# Patient Record
Sex: MALE
[De-identification: names, ages, dates, MRNs, and addresses within clinical notes are randomized per-mention and may not be internally consistent; named-entity substitution may affect disease eponyms.]

---

## 2017-01-01 PROBLEM — R05 COUGH: Status: ACTIVE | Noted: 2017-01-01

## 2017-01-10 ENCOUNTER — APPOINTMENT (OUTPATIENT)
Dept: RHEUMATOLOGY | Facility: CLINIC | Age: 62
End: 2017-01-10

## 2017-01-10 VITALS
HEART RATE: 96 BPM | WEIGHT: 230 LBS | HEIGHT: 72 IN | BODY MASS INDEX: 31.15 KG/M2 | DIASTOLIC BLOOD PRESSURE: 70 MMHG | SYSTOLIC BLOOD PRESSURE: 110 MMHG

## 2017-01-10 DIAGNOSIS — R53.82 CHRONIC FATIGUE, UNSPECIFIED: ICD-10-CM

## 2017-01-10 DIAGNOSIS — E83.119 HEMOCHROMATOSIS, UNSPECIFIED: ICD-10-CM

## 2017-01-10 DIAGNOSIS — E66.9 OBESITY, UNSPECIFIED: ICD-10-CM

## 2017-01-10 DIAGNOSIS — F41.8 OTHER SPECIFIED ANXIETY DISORDERS: ICD-10-CM

## 2022-04-06 ENCOUNTER — APPOINTMENT (OUTPATIENT)
Dept: PAIN MANAGEMENT | Facility: CLINIC | Age: 67
End: 2022-04-06
Payer: COMMERCIAL

## 2022-04-06 VITALS — HEIGHT: 72 IN | WEIGHT: 235 LBS | BODY MASS INDEX: 31.83 KG/M2

## 2022-04-06 PROCEDURE — 99214 OFFICE O/P EST MOD 30 MIN: CPT

## 2022-04-06 RX ORDER — NAPROXEN 500 MG/1
500 TABLET ORAL
Refills: 0 | Status: ACTIVE | COMMUNITY

## 2022-04-06 NOTE — PHYSICAL EXAM
[Normal Coordination] : normal coordination [Normal Mood and Affect] : normal mood and affect [] : diminished ROM in all planes [5___] : right quadriceps 5[unfilled]/5

## 2022-04-24 ENCOUNTER — TRANSCRIPTION ENCOUNTER (OUTPATIENT)
Age: 67
End: 2022-04-24

## 2022-04-27 ENCOUNTER — APPOINTMENT (OUTPATIENT)
Dept: PAIN MANAGEMENT | Facility: CLINIC | Age: 67
End: 2022-04-27
Payer: COMMERCIAL

## 2022-04-27 PROCEDURE — 64635 DESTROY LUMB/SAC FACET JNT: CPT | Mod: RT

## 2022-04-27 PROCEDURE — J3490M: CUSTOM

## 2022-04-27 PROCEDURE — 64636Z: CUSTOM | Mod: 59,RT

## 2022-04-27 NOTE — PROCEDURE
[FreeTextEntry3] : Date of Service: 04/27/2022 \par \par Account: [id number]\par \par Patient: TAMMIE HART \par \par YOB: 1955\par \par Age: 66 year\par \par \par PREOPERATIVE DIAGNOSIS: Spondylosis of Lumbar Region without Myelopathy or Radiculopathy (M47.816)\par \par     1. \par \par         POSTOPERATIVE DIAGNOSIS: Spondylosis of Lumbar Region without Myelopathy or Radiculopathy (M47.816)\par \par     1. \par \par         PROCEDURE:\par \par         1) Right L3, L4, L5 and Left L3, L4, L5 medial branch radiofrequency ablation under fluoroscopic guidance.                        \par \par Anesthesia:                                                      MAC\par \par \par Risks, benefits and alternatives of the procedure were discussed with the patient after which they agreed to proceed.  Patient was brought into fluoroscopy suite and was placed in prone position with hip support. Back was prepped and draped in a sterile fashion x3. Anesthesia initiated.\par \par Under AP visualization, the right and left sacral ala was identified and marked. Using a 25 gauge ½ inch needle the skin and subcutaneous structures at this point were localized with 1% Lidocaine using approximately 3 cc's 1% Lidocaine.  After this, a 20 gauge 100mm [place] radiofrequency needle with a 10mm curved tip was inserted and using depth direction depth technique under constant fluoroscopic visualization, the needle was advanced to the sacral ala until os was contacted. \par \par The camera was then redirected under AP view to visualize the right and left L4 and L5 vertebra. The camera was obliqued to approximately 30 degrees to reveal good Jake dog anatomical view. The junction of the superior articulate process and transverse process at the L4 and L5 levels  were then identified and marked. Skin and subcutaneous structures were then anesthetized with approximately 3 cc's of 1% Lidocaine at each of these levels. After which a 20 gauge 100mm [place] radiofrequency needle with a 10mm curved tip then advanced until the junction at the SAP and transverse process was met.  The camera was then directed through the lateral view and under constant fluoroscopic visualization, the needle tips were then advanced and confirmed at the junction of the SAP and transverse process. \par \par The stylette for the most cephalad needle at the L4 level was then removed and a [place] radiofrequency probe was then placed inside the needle. After their pedis' were checked at approximately 300 ohm, 50 hertz sensory stimulation was performed.  Patient experienced concordant pain in their low back at approximately 0.3 volts. The voltage was then increased to 1 volt. The patient reported increased low back pain symptoms without any radiation below the knee.  Stimulation was then changed to 2 hertz and increased slowly by .1 volt increments at approximately 0.5 volts, patient began to experience thumping like reproductive pain in their low back. The voltage was then increased to approximately 2.5 volts. Patient experienced increasing thumping without any sensation below their knee. This exact stimulation was then repeated for the L5 needle as well as sacral ala needle with concordant pain and no radiation below the knee. The 6 levels were then anesthetized with approximately 0.5 cc's of 1% Lidocaine. After which each area was then ablated at 80 degrees centigrade for 90 seconds each. Patient felt no pain reproduction during the ablation procedure.  After each of these levels were ablated and injected approximately 1 cc of 0.25% Bupivacaine plus 80 mg of Kenalog were then injected before the needles were removed.  Pressure was then applied to the low back. Band-aids were applied.  Patient was brought to the recovery, ambulated on their own after the procedure and reported decreased low back pain.\par \par Anesthesia personnel were present throughout the procedure. Patient was told to apply ice to the low back for 20 minutes on and 20 minutes off for focal symptoms for 24-48 hours. They should call the office if they have any questions or concerns. \par \par Amy Dinh M.D.\par

## 2022-05-18 ENCOUNTER — APPOINTMENT (OUTPATIENT)
Dept: PAIN MANAGEMENT | Facility: CLINIC | Age: 67
End: 2022-05-18
Payer: COMMERCIAL

## 2022-05-18 VITALS — WEIGHT: 235 LBS | BODY MASS INDEX: 31.83 KG/M2 | HEIGHT: 72 IN

## 2022-05-18 PROCEDURE — 99213 OFFICE O/P EST LOW 20 MIN: CPT

## 2022-05-18 NOTE — HISTORY OF PRESENT ILLNESS
[Lower back] : lower back [Dull/Aching] : dull/aching [Constant] : constant [Work] : work [Meds] : meds [6] : 6 [0] : 0 [Squeezing] : squeezing [] : no [FreeTextEntry1] : right side/hip [FreeTextEntry7] : Right leg

## 2022-05-29 ENCOUNTER — APPOINTMENT (OUTPATIENT)
Dept: ORTHOPEDIC SURGERY | Facility: CLINIC | Age: 67
End: 2022-05-29
Payer: COMMERCIAL

## 2022-05-29 VITALS — HEIGHT: 72 IN | BODY MASS INDEX: 31.83 KG/M2 | WEIGHT: 235 LBS

## 2022-05-29 PROCEDURE — 73010 X-RAY EXAM OF SHOULDER BLADE: CPT | Mod: RT

## 2022-05-29 PROCEDURE — 73030 X-RAY EXAM OF SHOULDER: CPT | Mod: RT

## 2022-05-29 PROCEDURE — 99203 OFFICE O/P NEW LOW 30 MIN: CPT

## 2022-05-29 RX ORDER — MELOXICAM 15 MG/1
15 TABLET ORAL DAILY
Qty: 30 | Refills: 0 | Status: ACTIVE | COMMUNITY
Start: 2022-05-29 | End: 1900-01-01

## 2022-05-29 NOTE — HISTORY OF PRESENT ILLNESS
[4] : 4 [de-identified] : This is a 66 year old RHD male with complaints of right shoulder pain for the "last few weeks". He denies any injury or trauma. He works as a  and attributes his pain to his work. Pain is worse with overhead lifting and behind his back. He has tried OTC medications with mild relief. He reports night pain, wakes him up. \par \par s/p right shoulder arthroscopy 2008 for a debridement \par \par PMH: denies \par Allergies: NKDA [FreeTextEntry5] : pt c.o pain in rt shoulder pain states he had sx on it in 2008 for impingement and gets shots occaisnally

## 2022-05-29 NOTE — DISCUSSION/SUMMARY
[de-identified] : Mr. TAMMIE HART is a 66 year year old male with right shoulder impingement syndrome. Diagnosis was discussed and treatment options were reviewed. The patient will begin a course of physical therapy and was provided with a prescription for oral anti-inflammatory medications. We discussed possible cortisone injection versus MRI if symptoms persist. He will follow up in 6 weeks with a shoulder specialist. \par \par The patient was prescribed an anti- inflammatory medication at today's visit. The patient was advised that this medication should be taken with food as they can cause gastrointestinal upset. They patient is also aware that these medications may exacerbate any pre existing hypertension and they should speak with their medical doctor before starting the medication. They were advised to stop the medication if they experience any stomach upset or develop headaches or blurry vision.\par \par

## 2022-05-29 NOTE — ASSESSMENT
[FreeTextEntry1] : 4 views of the shoulder demonstrate no fractures of dislocations. glenohumeral joint space well maintained. cystic changes about the greater tuberosity.

## 2022-06-12 ENCOUNTER — TRANSCRIPTION ENCOUNTER (OUTPATIENT)
Age: 67
End: 2022-06-12

## 2022-06-13 ENCOUNTER — APPOINTMENT (OUTPATIENT)
Dept: ORTHOPEDIC SURGERY | Facility: CLINIC | Age: 67
End: 2022-06-13
Payer: COMMERCIAL

## 2022-06-13 VITALS — BODY MASS INDEX: 31.83 KG/M2 | HEIGHT: 72 IN | WEIGHT: 235 LBS

## 2022-06-13 DIAGNOSIS — M75.21 BICIPITAL TENDINITIS, RIGHT SHOULDER: ICD-10-CM

## 2022-06-13 DIAGNOSIS — M25.511 PAIN IN RIGHT SHOULDER: ICD-10-CM

## 2022-06-13 PROCEDURE — 99213 OFFICE O/P EST LOW 20 MIN: CPT

## 2022-06-13 PROCEDURE — 99204 OFFICE O/P NEW MOD 45 MIN: CPT

## 2022-06-13 NOTE — PHYSICAL EXAM
[de-identified] : Constitutional: The general appearance of the patient is well developed, well nourished, no deformities and well groomed. Normal \par \par Gait: Gait and function is as follows: normal gait. \par \par Skin: Head and neck visualized skin is normal. Left upper extremity visualized skin is normal. Right upper extremity visualized skin is normal. Thoracic Skin of the thoracic spine shows visualized skin is normal. \par \par Cardiovascular: palpable radial pulse bilaterally, good capillary refill in digits of the bilateral upper extremities and no temperature or color changes in the bilateral upper extremities. \par \par Lymphatic: Normal Palpation of lymph nodes in the cervical. \par \par Neurologic: fine motor control in the bilateral upper extremities is intact. Deep Tendon Reflexes in Upper and Lower Extremities Negative Hobson's in the bilateral upper extremities. The patient is oriented to time, place and person. Sensation to light touch intact in the bilateral upper extremities. Mood and Affect is normal. \par \par Right Shoulder: Inspection of the shoulder/upper arm is as follows: no scapula winging, no biceps deformity and no AC joint deformity. Palpation of the shoulder/upper arm is as follows: There is tenderness at the proximal biceps tendon. Range of motion of the shoulder is as follows: Pain with internal rotation, external rotation, abduction and forward flexion. Strength of the shoulder is as follows: Supraspinatus 4/5. External Rotation 4/5. Internal Rotation 4/5. Deltoid 5/5 Ligament Stability and Special Tests of the shoulder is as follows: Neer test is positive. Britt' test is positive. Speed's test is positive. \par \par Left Shoulder: Inspection of the shoulder/upper arm is as follows: There is a full, pain-free, stable range of motion of the shoulder with normal strength and no tenderness to palpation. \par \par Neck: \par Inspection / Palpation of the cervical spine is as follows: There is a full, pain free, stable range of motion of the cervical spine with normal tone and no tenderness to palpation. \par \par Back, including spine: Inspection / Palpation of the thoracic/lumbar spine is as follows: There is a full, pain free, stable range of motion of the thoracic spine with a normal tone and not tenderness to palpation..\par

## 2022-06-13 NOTE — HISTORY OF PRESENT ILLNESS
[de-identified] : This is a 65yo male presenting to the office c/o ongoing right shoulder pain x6 months. Denies any specific injury or trauma. Pain is described as constant, reports pain has been getting progressively worse. Pain is located in the anterior and superior aspect of his shoulder. He presents with x-rays from [facility] which demonstrate AC joint narrowing. Patient denies any numbness or tingling. Patient is currently taking prednisone from a virus which is providing moderate relief for his shoulder pain. \par Works as \par

## 2022-06-13 NOTE — DISCUSSION/SUMMARY
[de-identified] : RUE: TTP LHBT, pain with bear hug and belly press\par +Speeds referred to biceps, + Obriens\par Pain and 90/90 ER \par (+drop arm, + painful arc)\par \par 67yo male presenting to the office c/o ongoing right shoulder pain x6 months. Denies any specific injury or trauma\par X-rays from  demonstrate AC joint narrowing \par Patient is currently taking prednisone for a virus which provides moderate relief for his shoulder pain at this time \par recommended MRI to further evaluate biceps labral complex injury\par Follow up 1-2 weeks \par Can consider cortisone injection (LHBT) pending MRI results\par \par \par Based on the patient’s history and physical exam findings, I am concerned about the possibility of a biceps labral complex tear.  The patient has pain and subjective weakness consistent with this diagnosis.  Therefore, I recommend an MRI to evaluate for a biceps labral tear. The patient will follow-up after MRI to discuss further treatment options. Surgery is an option if full thickness cuff tear seen on MRI\par \par (1) We discussed a comprehensive treatment plans that included a prescription management plan involving the use of prescription strength medications to include Ibuprofen 600-800 mg TID, versus 500-650 mg Tylenol. We also discussed prescribing topical diclofenac (Voltaren gel) as well as once daily Meloxicam 15 mg.\par (2) The patient has More Than One chronic injuries/illnesses as outlined, discussed, and documented by ICD 10 codes listed, as well as the HPI and Plan section.\par There is a moderate risk of morbidity with further treatment, especially from use of prescription strength medications and possible side effects of these medications which include upset stomach and cardiac/renal issues with long term use were discussed.\par (3) I recommended that the patient follow-up with their medical physician to discuss any significant specific potential issues with long term use such as interactions with current medications or with exacerbation of underlying medical morbidities. \par \par Attestation:\par I, Gloria Balderas , attest that this documentation has been prepared under the direction and in the presence of Provider Matt Ray MD.\par The documentation recorded by the scribe, in my presence, accurately reflects the service I personally performed, and the decisions made by me with my edits as appropriate.\par Matt Ray MD\par \par

## 2022-06-27 ENCOUNTER — APPOINTMENT (OUTPATIENT)
Dept: ORTHOPEDIC SURGERY | Facility: CLINIC | Age: 67
End: 2022-06-27

## 2022-08-01 ENCOUNTER — APPOINTMENT (OUTPATIENT)
Dept: ORTHOPEDIC SURGERY | Facility: CLINIC | Age: 67
End: 2022-08-01

## 2022-08-01 VITALS — WEIGHT: 235 LBS | HEIGHT: 72 IN | BODY MASS INDEX: 31.83 KG/M2

## 2022-08-01 PROCEDURE — 99213 OFFICE O/P EST LOW 20 MIN: CPT

## 2022-08-01 NOTE — HISTORY OF PRESENT ILLNESS
[de-identified] : Patient Complaint - 8/10/21- No relief from PT, wants to try LESIs\par 7/6/21- Had MRI: IMPRESSION:\par Straightening of lumbar lordosis. Grade 1 anterolisthesis of L3 on L4 and trace retrolisthesis of L5 on S1.\par L3-4: There is diffuse disc bulge, bilateral facet arthropathy and ligamentum flavum buckling. There is moderate spinal\par canal stenosis. There is bilateral lateral recess narrowing and indentation upon the descending both L4 nerve\par roots. There is mild right and mild-to-moderate left foraminal narrowing. There is encroachment upon the exiting left L3\par nerve root.\par L4-5: There is diffuse disc bulge and bilateral facet arthropathy. There is prominent dorsal epidural fat. Findings\par combined resulting severe thecal sac narrowing. There is bilateral lateral recess narrowing and indentation upon the\par descending both L5 nerve roots. There is mild right and mild-to-moderate left\par foraminal narrowing.\par L5-S1: There is a central disc herniation indenting upon the ventral thecal sac. There is no significant spinal canal\par stenosis. There is left greater than right facet arthropathy. There is no significant foraminal narrowing [FreeTextEntry1] : back [de-identified] : Patient has been going to pain management- Dr. Amy Dinh

## 2022-08-01 NOTE — REASON FOR VISIT
[FreeTextEntry2] : 8/1/22- Has finished pain management including ablation, no improvement, wants to know the next step

## 2022-08-01 NOTE — PHYSICAL EXAM
[Flexion] : flexion [Extension] : extension [Bending to left] : bending to left [Bending to right] : bending to right [Absent] : achilles reflex absent [] : negative sitting straight leg raise

## 2022-09-07 ENCOUNTER — APPOINTMENT (OUTPATIENT)
Dept: ORTHOPEDIC SURGERY | Facility: CLINIC | Age: 67
End: 2022-09-07

## 2022-09-07 DIAGNOSIS — R22.31 LOCALIZED SWELLING, MASS AND LUMP, RIGHT UPPER LIMB: ICD-10-CM

## 2022-09-07 PROCEDURE — 72110 X-RAY EXAM L-2 SPINE 4/>VWS: CPT

## 2022-09-07 PROCEDURE — 99215 OFFICE O/P EST HI 40 MIN: CPT

## 2022-09-07 PROCEDURE — 72170 X-RAY EXAM OF PELVIS: CPT

## 2022-09-07 NOTE — DISCUSSION/SUMMARY
[de-identified] : reviewed the imaging and the case with him \par \par has a painless lump in the right arm - \par indicated for MRi of the right arm to eval that \par \par for the spine we discussed tx optoins \par \par The patient has tried conservative treatment for this condition including time/activity modification/various medications/therapy/injections without significant relief.  Do not suspect that further conservative treatment will lead to a resolution of symptoms.  Patient remains with persistent activity limited symptoms and is therefore indicated for surgical intervention \par \par as far as surgery goes L3-5 fusion would be my thought - has a spondylolisthesis L3-4 and severe facet arthropathy at L4-5 \par \par d/w the patient for laminecotomy and/or fusion \par \par We've discussed the surgery details including instrumentation and grafting options (local, allograft, ICBG, and biologics) as well as potential for complications including but not limited to pain, scar and infection. There is also a possibility for hardware complication such as malposition of hardware,hardware loosening, pullout, failure or fracture of bone, adjacent segment disease,pseudarthrosis, and need for future surgery. We discussed potential for injury to nerves, spinal cord or blood vessels, paralysis, blindness, need for transfusion, general anesthesia, allergic reaction, prolonged intubation,myocardial infarction, stroke, deep venous thrombosis, pulmonary embolus, and death.  Spinal fluid leak may occur and may require prolonged time in the hospital and also further surgical procedures including drain placement.  The patient understands these things and all questions are answered to his/her satisfaction.\par \par The patient woud  need a medical clearance and pre-admission testing prior to surgery\par \par We would use neuromonitoring in order to keep things as safe as possilble.\par \par The procedure does not come with a guarantee of success or of satisfaction on the patient's behalf \par \par he is not intersted in the fusion surgery at this point in time and will seek additional opinions - if he reconsiders or has other questions he should follow up with me

## 2022-09-07 NOTE — HISTORY OF PRESENT ILLNESS
[Lower back] : lower back [Gradual] : gradual [Sudden] : sudden [8] : 8 [7] : 7 [Burning] : burning [Shooting] : shooting [Stabbing] : stabbing [Constant] : constant [Exercising] : exercising [de-identified] : 9/7/22: 67 y/o M presenting with L back pain. Seen by Dr. Srinivasan in the past and has tried PT and LESI with no relieve.  Down the right leg - 3 years of progressive symptoms - right side of the lower back \par \par Had seen Dr Dinh with short term relief with OBED and facet blocks \par Tried a brace \par ibuprofen \par \par XRAY L Spine: L4-5 Disc space narrowing with anterior osteophytes\par XRAY Pelvis: Mild joint space narrowing\par \par depression/anxiety \par sleep apnea\par No known cancer \par has knee pain that hes had injections \par \par  - working currently \par \par MRi l spine june 2021- L3-4: There is diffuse disc bulge, bilateral facet arthropathy and ligamentum\par flavum buckling. There is moderate spinal canal stenosis. There is bilateral\par lateral recess narrowing and indentation upon the descending both L4 nerve\par roots. There is mild right and mild-to-moderate left foraminal narrowing. There\par is encroachment upon the exiting left L3 nerve root.\par \par L4-5: There is diffuse disc bulge and bilateral facet arthropathy. There is\par prominent dorsal epidural fat. Findings combined resulting severe thecal sac\par narrowing. There is bilateral lateral recess narrowing and indentation upon the\par descending both L5 nerve roots. There is mild right and mild-to-moderate left\par foraminal narrowing\par \par has noticed a nodule in the right - painless - over the last month  [] : Post Surgical Visit: no [FreeTextEntry1] : L spine  [FreeTextEntry5] : Pt has seen dr ritter in the past for this and has had multiple inj as well as an ablasan with no relief and pt is here to discuss surgical intervention  [de-identified] : xrays mris  [de-identified] : None

## 2022-09-07 NOTE — PHYSICAL EXAM
[Right] : right shoulder [FreeTextEntry3] : right arm with palpable painless lumb in the biceps area of the arm

## 2023-04-18 ENCOUNTER — APPOINTMENT (OUTPATIENT)
Dept: ORTHOPEDIC SURGERY | Facility: CLINIC | Age: 68
End: 2023-04-18
Payer: MEDICARE

## 2023-04-18 VITALS — WEIGHT: 235 LBS | HEIGHT: 72 IN | BODY MASS INDEX: 31.83 KG/M2

## 2023-04-18 DIAGNOSIS — M75.41 IMPINGEMENT SYNDROME OF RIGHT SHOULDER: ICD-10-CM

## 2023-04-18 PROCEDURE — 20610 DRAIN/INJ JOINT/BURSA W/O US: CPT | Mod: RT

## 2023-04-18 PROCEDURE — 99213 OFFICE O/P EST LOW 20 MIN: CPT | Mod: 25

## 2023-04-18 NOTE — HISTORY OF PRESENT ILLNESS
[7] : 7 [5] : 5 [Dull/Aching] : dull/aching [Sharp] : sharp [Intermittent] : intermittent [Nothing helps with pain getting better] : Nothing helps with pain getting better [de-identified] : 4/18/23- Has increasing soreness right shoulder down arm somewhat for past few months. No injury. Had what sounds like a cyst over AC joint that he popped by pushing on it. No neck pain or numbness [] : no [FreeTextEntry5] : Denies injury, Hx of bursitis and Impingement of the RT shoulder, Pt has had surgery for the impingement in the past in June 2008. Pt was given a CSI in [place] 15 2021. Pt would like another CSI today

## 2023-10-09 ENCOUNTER — APPOINTMENT (OUTPATIENT)
Dept: UROLOGY | Facility: CLINIC | Age: 68
End: 2023-10-09
Payer: COMMERCIAL

## 2023-10-09 VITALS
WEIGHT: 235 LBS | SYSTOLIC BLOOD PRESSURE: 180 MMHG | OXYGEN SATURATION: 98 % | BODY MASS INDEX: 31.83 KG/M2 | DIASTOLIC BLOOD PRESSURE: 100 MMHG | HEART RATE: 79 BPM | HEIGHT: 72 IN

## 2023-10-09 DIAGNOSIS — R31.0 GROSS HEMATURIA: ICD-10-CM

## 2023-10-09 LAB
BILIRUB UR QL STRIP: NEGATIVE
GLUCOSE UR-MCNC: NEGATIVE
HCG UR QL: 0.2 EU/DL
HGB UR QL STRIP.AUTO: NORMAL
KETONES UR-MCNC: NEGATIVE
LEUKOCYTE ESTERASE UR QL STRIP: NEGATIVE
NITRITE UR QL STRIP: NEGATIVE
PH UR STRIP: 6
PROT UR STRIP-MCNC: NORMAL
SP GR UR STRIP: 1.02

## 2023-10-09 PROCEDURE — 99204 OFFICE O/P NEW MOD 45 MIN: CPT | Mod: 25

## 2023-10-09 PROCEDURE — 52000 CYSTOURETHROSCOPY: CPT

## 2023-10-09 PROCEDURE — 81003 URINALYSIS AUTO W/O SCOPE: CPT | Mod: QW

## 2023-10-11 LAB — URINE CYTOLOGY: NORMAL

## 2023-10-13 ENCOUNTER — APPOINTMENT (OUTPATIENT)
Dept: ORTHOPEDIC SURGERY | Facility: CLINIC | Age: 68
End: 2023-10-13
Payer: COMMERCIAL

## 2023-10-13 VITALS
HEART RATE: 102 BPM | WEIGHT: 235 LBS | SYSTOLIC BLOOD PRESSURE: 173 MMHG | BODY MASS INDEX: 31.83 KG/M2 | HEIGHT: 72 IN | DIASTOLIC BLOOD PRESSURE: 93 MMHG | TEMPERATURE: 98.1 F

## 2023-10-13 PROCEDURE — 72100 X-RAY EXAM L-S SPINE 2/3 VWS: CPT

## 2023-10-13 PROCEDURE — 99204 OFFICE O/P NEW MOD 45 MIN: CPT

## 2024-05-17 ENCOUNTER — APPOINTMENT (OUTPATIENT)
Dept: ORTHOPEDIC SURGERY | Facility: CLINIC | Age: 69
End: 2024-05-17
Payer: COMMERCIAL

## 2024-05-17 VITALS
BODY MASS INDEX: 31.83 KG/M2 | SYSTOLIC BLOOD PRESSURE: 172 MMHG | DIASTOLIC BLOOD PRESSURE: 83 MMHG | WEIGHT: 235 LBS | HEART RATE: 86 BPM | HEIGHT: 72 IN

## 2024-05-17 DIAGNOSIS — M48.062 SPINAL STENOSIS, LUMBAR REGION WITH NEUROGENIC CLAUDICATION: ICD-10-CM

## 2024-05-17 DIAGNOSIS — M51.36 OTHER INTERVERTEBRAL DISC DEGENERATION, LUMBAR REGION: ICD-10-CM

## 2024-05-17 DIAGNOSIS — M43.16 SPONDYLOLISTHESIS, LUMBAR REGION: ICD-10-CM

## 2024-05-17 PROCEDURE — 99215 OFFICE O/P EST HI 40 MIN: CPT

## 2024-05-17 PROCEDURE — 72100 X-RAY EXAM L-S SPINE 2/3 VWS: CPT

## 2024-05-17 NOTE — HISTORY OF PRESENT ILLNESS
[de-identified] : Keagan is a very pleasant 68-year-old gentleman has been employed as a  he states his primary complaint is neurogenic claudication/bilateral leg pain with prolonged stance saw him a few months ago we discussed lumbar spinal stenosis and his presentation of neurogenic claudication unfortunately his wife passed away in the fall from cancer and he has actually been diagnosed with bladder cancer currently under care status post biopsies surgical treatments and now is on immunotherapy as guided by [facility].  Based upon worsening neurogenic claudication patient wishes to discuss lumbar laminectomy surgery.  His last MRI was in July 2023.  He has also had multiple surgical consultations [Worsening] : worsening [___ yrs] : [unfilled] year(s) ago [10] : a maximum pain level of 10/10 [Prolonged Standing] : worsened by prolonged standing [Walking] : worsened by walking [Rest] : relieved by rest [Ataxia] : no ataxia [Incontinence] : no incontinence [Loss of Dexterity] : good dexterity [Urinary Ret.] : urinary retention [de-identified] :  sitting down and forward flexion

## 2024-05-17 NOTE — PHYSICAL EXAM
[Stooped] : stooped [de-identified] : CONSTITUTIONAL: The patient is a very pleasant individual who is well-nourished and who appears stated age. PSYCHIATRIC: The patient is alert and oriented X 3 and in no apparent distress, and participates with orthopedic evaluation well. HEAD: Atraumatic and is nonsyndromic in appearance. EENT: No visible thyromegaly, EOMI. RESPIRATORY: Respiratory rate is regular, not dyspneic on examination. LYMPHATICS: There is no inguinal lymphadenopathy INTEGUMENTARY: Skin is clean, dry, and intact about the bilateral lower extremities and lumbar spine. VASCULAR: There is brisk capillary refill about the bilateral lower extremities. NEUROLOGIC: There are no pathologic reflexes. There is no decrease in sensation of the bilateral lower extremities on manual  examination. Deep tendon reflexes are well maintained at 2+/4 of the bilateral lower extremities and are symmetric.  neurologic exam is consistent with reproducible neurogenic claudication with stance. MUSCULOSKELETAL: There is no visible muscular atrophy. Manual motor strength is well maintained in the bilateral lower extremities. Range of motion of lumbar spine is well maintained. The patient ambulates in a non-myelopathic manner. Negative tension sign and straight leg raise bilaterally. Quad extension, ankle dorsiflexion, EHL, plantar flexion, and ankle eversion are well preserved. Normal secondary orthopaedic exam of bilateral hips, greater trochanteric area, knees and ankles,  no complaints of mechanically orientated low back pain only neurogenic claudication [de-identified] : MRI of the lumbar spine has been reviewed from July 2023 showing lumbar degenerative disc primarily at [id number] and lastly to 3 there is moderate stenosis at 2 3 severe stenosis at 3445 there are Modic changes within the 4 5 disc space.  To a lesser degree 5 1.  Previous lumbar x-rays have been reviewed from October 2023 demonstrating again this for 5 lumbar degenerative disc disease loss of disc space a very trace spondylolisthesis of 3 4 overall lordosis is maintained.  Lumbar x-rays taken on today's date of May 17, 2024 again show loss of disc height at 4551 and again this very trace 3 4 spondylolisthesis otherwise no acute osseous abnormalities

## 2024-05-17 NOTE — REASON FOR VISIT
[Follow-Up Visit] : a follow-up visit for [Spinal Stenosis] : spinal stenosis [FreeTextEntry2] : back pain

## 2024-05-17 NOTE — DISCUSSION/SUMMARY
[de-identified] : Based upon longstanding history of lumbar stenosis and a primary working diagnosis of neurogenic claudication patient wishes to discuss and planned surgical management he states he has been dealing with this for years is progressively getting worse he states he essentially has no back pain that is really only neurogenic claudication when he stands and walks.  He is currently under the care of [facility] for bladder cancer he is status post biopsies he is on a multi regimen of immune therapy and with regard to surgical timing this needs to be coordinated with Rubio as to not interfere with his Keytruda and other medications that he is now on.  His last MRI was in the fall 2023 this will have to be redone is actually from July 2023.  X-rays taken on today's date did not demonstrate any active significant spondylolisthesis so I think we can keep this to an isolated primarily L3-L4-L5 instrumented fusion with a partial L2 bilateral hemilaminotomy.  I also wish to give this is minimally invasive as we can secondary to his active cancer treatment he just lost his wife to cancer as well in the early winter/late fall 2023.  He also is reliant on his income and can only miss a minimal time from work he is employed as a  and does primarily diagnostic work I think will be out of work approximately 3 weeks and or less possibly more depending on back pain and postop improvements/recovery process.  Patient wishes to continue planning an L2-L5 lumbar laminectomy.  I am also going to avoid an instrumented fusion at 4 5 because I think this may set him up for adjacent segment disease he wishes for the most rapid recovery I did discuss the potential for incomplete resolution of signs and symptoms secondary to L4 neuroforaminal compromise.  He is understanding of this and wishes again for only the laminectomy I do think that is very reasonable in order to facilitate an improvement in his neurogenic claudication and a rapid return to work  A long discussion was had with the patient regarding Lumbar surgical plan of Lumbar laminectomy at L2-L5. Anatomic models, Xrays, CT scans/MRI's were utilized to provide a firm understanding of their surgical plan. Patient is aware that surgery is elective in nature and he/she choosing to proceed with surgery. Risks, benefits, alternatives were discussed and all questions, comments and concerns were encouraged and answered to the patient's satisfaction. The statistical probability of improvement was discussed at length as well as post surgical course. Literature from [place] [employer] was provided to the patient regarding the specific type of surgery as well as a 5 page written surgical consent which the patient will need to sign and return to the office prior to surgical date. Consent forms highlight specific complications related to the complex nature of spinal surgery AA?A? Risks of lumbar surgery include: persistent pain, adjacent segment disease (which will require more surgery in future), dural tears, neurologic injury, and wound healing complications AA?A? Benefits of lumbar surgery include Improved neurologic and pain score AA?A? We also discussed with the patient complications of incisions directly related to obesity, diabetes, previous wound complications or post-surgical wound infections, smoking, neuropathy, and chronic anticoagulation. This risk has been specifically discussed and the patient will discuss modifiable risk factors to be optimized prior to surgical management. A multimodality approach of primary care physician, and medicine subspecialist will be utilized to optimize medical risk factors. AA?A? If patient is a smoker, discontinuation of smoking was advised and must be accomplished 6-8 weeks prior to surgery date. Patient was advised that help with quitting smoking is available through [place] [employer] and phone number/website was provided, or patient can ask assistance from primary care provider. Elective surgery will not be performed unless patient complies with this policy.

## 2024-05-23 ENCOUNTER — NON-APPOINTMENT (OUTPATIENT)
Age: 69
End: 2024-05-23

## 2024-05-28 ENCOUNTER — NON-APPOINTMENT (OUTPATIENT)
Age: 69
End: 2024-05-28

## 2024-05-28 ENCOUNTER — APPOINTMENT (OUTPATIENT)
Dept: MRI IMAGING | Facility: CLINIC | Age: 69
End: 2024-05-28
Payer: COMMERCIAL

## 2024-05-28 PROCEDURE — 72148 MRI LUMBAR SPINE W/O DYE: CPT

## 2024-05-29 ENCOUNTER — NON-APPOINTMENT (OUTPATIENT)
Age: 69
End: 2024-05-29

## 2024-05-31 ENCOUNTER — RESULT REVIEW (OUTPATIENT)
Age: 69
End: 2024-05-31

## 2024-05-31 ENCOUNTER — INPATIENT (INPATIENT)
Facility: HOSPITAL | Age: 69
LOS: 5 days | Discharge: HOME | DRG: 552 | End: 2024-06-06
Attending: ORTHOPAEDIC SURGERY | Admitting: ORTHOPAEDIC SURGERY
Payer: COMMERCIAL

## 2024-05-31 VITALS
WEIGHT: 242.07 LBS | RESPIRATION RATE: 18 BRPM | SYSTOLIC BLOOD PRESSURE: 144 MMHG | TEMPERATURE: 98 F | DIASTOLIC BLOOD PRESSURE: 95 MMHG | HEART RATE: 108 BPM | HEIGHT: 72 IN | OXYGEN SATURATION: 98 %

## 2024-05-31 DIAGNOSIS — M54.9 DORSALGIA, UNSPECIFIED: ICD-10-CM

## 2024-05-31 LAB
ALBUMIN SERPL ELPH-MCNC: 4.3 G/DL — SIGNIFICANT CHANGE UP (ref 3.3–5.2)
ALP SERPL-CCNC: 115 U/L — SIGNIFICANT CHANGE UP (ref 40–120)
ALT FLD-CCNC: 36 U/L — SIGNIFICANT CHANGE UP
ANION GAP SERPL CALC-SCNC: 16 MMOL/L — SIGNIFICANT CHANGE UP (ref 5–17)
APPEARANCE UR: CLEAR — SIGNIFICANT CHANGE UP
APTT BLD: 28.4 SEC — SIGNIFICANT CHANGE UP (ref 24.5–35.6)
AST SERPL-CCNC: 27 U/L — SIGNIFICANT CHANGE UP
BACTERIA # UR AUTO: NEGATIVE /HPF — SIGNIFICANT CHANGE UP
BASOPHILS # BLD AUTO: 0.04 K/UL — SIGNIFICANT CHANGE UP (ref 0–0.2)
BASOPHILS NFR BLD AUTO: 0.6 % — SIGNIFICANT CHANGE UP (ref 0–2)
BILIRUB SERPL-MCNC: 0.4 MG/DL — SIGNIFICANT CHANGE UP (ref 0.4–2)
BILIRUB UR-MCNC: NEGATIVE — SIGNIFICANT CHANGE UP
BUN SERPL-MCNC: 15.9 MG/DL — SIGNIFICANT CHANGE UP (ref 8–20)
CALCIUM SERPL-MCNC: 9.1 MG/DL — SIGNIFICANT CHANGE UP (ref 8.4–10.5)
CAST: 1 /LPF — SIGNIFICANT CHANGE UP (ref 0–4)
CHLORIDE SERPL-SCNC: 101 MMOL/L — SIGNIFICANT CHANGE UP (ref 96–108)
CO2 SERPL-SCNC: 19 MMOL/L — LOW (ref 22–29)
COLOR SPEC: YELLOW — SIGNIFICANT CHANGE UP
CREAT SERPL-MCNC: 0.71 MG/DL — SIGNIFICANT CHANGE UP (ref 0.5–1.3)
DIFF PNL FLD: NEGATIVE — SIGNIFICANT CHANGE UP
EGFR: 101 ML/MIN/1.73M2 — SIGNIFICANT CHANGE UP
EOSINOPHIL # BLD AUTO: 0.01 K/UL — SIGNIFICANT CHANGE UP (ref 0–0.5)
EOSINOPHIL NFR BLD AUTO: 0.1 % — SIGNIFICANT CHANGE UP (ref 0–6)
GLUCOSE SERPL-MCNC: 226 MG/DL — HIGH (ref 70–99)
GLUCOSE UR QL: 250 MG/DL
HCT VFR BLD CALC: 44.9 % — SIGNIFICANT CHANGE UP (ref 39–50)
HGB BLD-MCNC: 16 G/DL — SIGNIFICANT CHANGE UP (ref 13–17)
IMM GRANULOCYTES NFR BLD AUTO: 1.2 % — HIGH (ref 0–0.9)
INR BLD: 0.87 RATIO — SIGNIFICANT CHANGE UP (ref 0.85–1.18)
KETONES UR-MCNC: NEGATIVE MG/DL — SIGNIFICANT CHANGE UP
LEUKOCYTE ESTERASE UR-ACNC: ABNORMAL
LYMPHOCYTES # BLD AUTO: 0.52 K/UL — LOW (ref 1–3.3)
LYMPHOCYTES # BLD AUTO: 7.7 % — LOW (ref 13–44)
MCHC RBC-ENTMCNC: 30.2 PG — SIGNIFICANT CHANGE UP (ref 27–34)
MCHC RBC-ENTMCNC: 35.6 GM/DL — SIGNIFICANT CHANGE UP (ref 32–36)
MCV RBC AUTO: 84.7 FL — SIGNIFICANT CHANGE UP (ref 80–100)
MONOCYTES # BLD AUTO: 0.11 K/UL — SIGNIFICANT CHANGE UP (ref 0–0.9)
MONOCYTES NFR BLD AUTO: 1.6 % — LOW (ref 2–14)
NEUTROPHILS # BLD AUTO: 6.01 K/UL — SIGNIFICANT CHANGE UP (ref 1.8–7.4)
NEUTROPHILS NFR BLD AUTO: 88.8 % — HIGH (ref 43–77)
NITRITE UR-MCNC: NEGATIVE — SIGNIFICANT CHANGE UP
PH UR: 5.5 — SIGNIFICANT CHANGE UP (ref 5–8)
PLATELET # BLD AUTO: 227 K/UL — SIGNIFICANT CHANGE UP (ref 150–400)
POTASSIUM SERPL-MCNC: 4.8 MMOL/L — SIGNIFICANT CHANGE UP (ref 3.5–5.3)
POTASSIUM SERPL-SCNC: 4.8 MMOL/L — SIGNIFICANT CHANGE UP (ref 3.5–5.3)
PROT SERPL-MCNC: 7.3 G/DL — SIGNIFICANT CHANGE UP (ref 6.6–8.7)
PROT UR-MCNC: NEGATIVE MG/DL — SIGNIFICANT CHANGE UP
PROTHROM AB SERPL-ACNC: 9.7 SEC — SIGNIFICANT CHANGE UP (ref 9.5–13)
RBC # BLD: 5.3 M/UL — SIGNIFICANT CHANGE UP (ref 4.2–5.8)
RBC # FLD: 11.8 % — SIGNIFICANT CHANGE UP (ref 10.3–14.5)
RBC CASTS # UR COMP ASSIST: 1 /HPF — SIGNIFICANT CHANGE UP (ref 0–4)
SODIUM SERPL-SCNC: 136 MMOL/L — SIGNIFICANT CHANGE UP (ref 135–145)
SP GR SPEC: 1.02 — SIGNIFICANT CHANGE UP (ref 1–1.03)
SQUAMOUS # UR AUTO: 0 /HPF — SIGNIFICANT CHANGE UP (ref 0–5)
UROBILINOGEN FLD QL: 0.2 MG/DL — SIGNIFICANT CHANGE UP (ref 0.2–1)
WBC # BLD: 6.77 K/UL — SIGNIFICANT CHANGE UP (ref 3.8–10.5)
WBC # FLD AUTO: 6.77 K/UL — SIGNIFICANT CHANGE UP (ref 3.8–10.5)
WBC UR QL: 15 /HPF — HIGH (ref 0–5)

## 2024-05-31 PROCEDURE — 99285 EMERGENCY DEPT VISIT HI MDM: CPT

## 2024-05-31 PROCEDURE — 93306 TTE W/DOPPLER COMPLETE: CPT | Mod: 26

## 2024-05-31 PROCEDURE — 99223 1ST HOSP IP/OBS HIGH 75: CPT

## 2024-05-31 PROCEDURE — 99223 1ST HOSP IP/OBS HIGH 75: CPT | Mod: 57

## 2024-05-31 RX ORDER — HYDROMORPHONE HYDROCHLORIDE 2 MG/ML
4 INJECTION INTRAMUSCULAR; INTRAVENOUS; SUBCUTANEOUS EVERY 4 HOURS
Refills: 0 | Status: DISCONTINUED | OUTPATIENT
Start: 2024-05-31 | End: 2024-06-03

## 2024-05-31 RX ORDER — ACETAMINOPHEN 500 MG
975 TABLET ORAL ONCE
Refills: 0 | Status: DISCONTINUED | OUTPATIENT
Start: 2024-06-03 | End: 2024-06-03

## 2024-05-31 RX ORDER — OXYCODONE HYDROCHLORIDE 5 MG/1
10 TABLET ORAL
Refills: 0 | Status: DISCONTINUED | OUTPATIENT
Start: 2024-05-31 | End: 2024-06-03

## 2024-05-31 RX ORDER — CYCLOBENZAPRINE HYDROCHLORIDE 10 MG/1
10 TABLET, FILM COATED ORAL ONCE
Refills: 0 | Status: COMPLETED | OUTPATIENT
Start: 2024-05-31 | End: 2024-05-31

## 2024-05-31 RX ORDER — MORPHINE SULFATE 50 MG/1
4 CAPSULE, EXTENDED RELEASE ORAL ONCE
Refills: 0 | Status: DISCONTINUED | OUTPATIENT
Start: 2024-05-31 | End: 2024-05-31

## 2024-05-31 RX ORDER — OXYCODONE AND ACETAMINOPHEN 5; 325 MG/1; MG/1
1 TABLET ORAL
Qty: 12 | Refills: 0
Start: 2024-05-31 | End: 2024-06-03

## 2024-05-31 RX ORDER — CEFAZOLIN SODIUM 1 G
2000 VIAL (EA) INJECTION ONCE
Refills: 0 | Status: DISCONTINUED | OUTPATIENT
Start: 2024-06-03 | End: 2024-06-03

## 2024-05-31 RX ORDER — OXYCODONE HYDROCHLORIDE 5 MG/1
5 TABLET ORAL EVERY 4 HOURS
Refills: 0 | Status: DISCONTINUED | OUTPATIENT
Start: 2024-05-31 | End: 2024-06-03

## 2024-05-31 RX ORDER — DEXAMETHASONE 0.5 MG/5ML
6 ELIXIR ORAL ONCE
Refills: 0 | Status: DISCONTINUED | OUTPATIENT
Start: 2024-05-31 | End: 2024-05-31

## 2024-05-31 RX ORDER — KETOROLAC TROMETHAMINE 30 MG/ML
30 SYRINGE (ML) INJECTION ONCE
Refills: 0 | Status: DISCONTINUED | OUTPATIENT
Start: 2024-05-31 | End: 2024-05-31

## 2024-05-31 RX ORDER — DIAZEPAM 5 MG
5 TABLET ORAL
Refills: 0 | Status: DISCONTINUED | OUTPATIENT
Start: 2024-05-31 | End: 2024-06-03

## 2024-05-31 RX ORDER — ACETAMINOPHEN 500 MG
975 TABLET ORAL EVERY 8 HOURS
Refills: 0 | Status: DISCONTINUED | OUTPATIENT
Start: 2024-05-31 | End: 2024-06-03

## 2024-05-31 RX ORDER — SERTRALINE 25 MG/1
100 TABLET, FILM COATED ORAL DAILY
Refills: 0 | Status: DISCONTINUED | OUTPATIENT
Start: 2024-05-31 | End: 2024-06-03

## 2024-05-31 RX ORDER — POVIDONE-IODINE 5 %
1 AEROSOL (ML) TOPICAL ONCE
Refills: 0 | Status: COMPLETED | OUTPATIENT
Start: 2024-06-03 | End: 2024-06-03

## 2024-05-31 RX ORDER — ENOXAPARIN SODIUM 100 MG/ML
40 INJECTION SUBCUTANEOUS ONCE
Refills: 0 | Status: COMPLETED | OUTPATIENT
Start: 2024-05-31 | End: 2024-06-01

## 2024-05-31 RX ORDER — CHLORHEXIDINE GLUCONATE 213 G/1000ML
1 SOLUTION TOPICAL DAILY
Refills: 0 | Status: DISCONTINUED | OUTPATIENT
Start: 2024-05-31 | End: 2024-06-03

## 2024-05-31 RX ORDER — CEFAZOLIN SODIUM 1 G
2000 VIAL (EA) INJECTION ONCE
Refills: 0 | Status: DISCONTINUED | OUTPATIENT
Start: 2024-05-31 | End: 2024-05-31

## 2024-05-31 RX ORDER — CELECOXIB 200 MG/1
200 CAPSULE ORAL ONCE
Refills: 0 | Status: COMPLETED | OUTPATIENT
Start: 2024-06-03 | End: 2024-06-03

## 2024-05-31 RX ORDER — BUPROPION HYDROCHLORIDE 150 MG/1
300 TABLET, EXTENDED RELEASE ORAL DAILY
Refills: 0 | Status: DISCONTINUED | OUTPATIENT
Start: 2024-05-31 | End: 2024-06-03

## 2024-05-31 RX ORDER — ACETAMINOPHEN 500 MG
1000 TABLET ORAL ONCE
Refills: 0 | Status: COMPLETED | OUTPATIENT
Start: 2024-05-31 | End: 2024-05-31

## 2024-05-31 RX ORDER — CYCLOBENZAPRINE HYDROCHLORIDE 10 MG/1
1 TABLET, FILM COATED ORAL
Qty: 15 | Refills: 0
Start: 2024-05-31 | End: 2024-06-04

## 2024-05-31 RX ORDER — APREPITANT 80 MG/1
40 CAPSULE ORAL ONCE
Refills: 0 | Status: COMPLETED | OUTPATIENT
Start: 2024-06-03 | End: 2024-06-03

## 2024-05-31 RX ORDER — TAMSULOSIN HYDROCHLORIDE 0.4 MG/1
0.4 CAPSULE ORAL AT BEDTIME
Refills: 0 | Status: DISCONTINUED | OUTPATIENT
Start: 2024-05-31 | End: 2024-06-03

## 2024-05-31 RX ORDER — MORPHINE SULFATE 50 MG/1
2 CAPSULE, EXTENDED RELEASE ORAL ONCE
Refills: 0 | Status: DISCONTINUED | OUTPATIENT
Start: 2024-05-31 | End: 2024-05-31

## 2024-05-31 RX ADMIN — MORPHINE SULFATE 2 MILLIGRAM(S): 50 CAPSULE, EXTENDED RELEASE ORAL at 19:40

## 2024-05-31 RX ADMIN — Medication 1000 MILLIGRAM(S): at 15:16

## 2024-05-31 RX ADMIN — MORPHINE SULFATE 4 MILLIGRAM(S): 50 CAPSULE, EXTENDED RELEASE ORAL at 10:09

## 2024-05-31 RX ADMIN — MORPHINE SULFATE 4 MILLIGRAM(S): 50 CAPSULE, EXTENDED RELEASE ORAL at 09:39

## 2024-05-31 RX ADMIN — Medication 30 MILLIGRAM(S): at 14:44

## 2024-05-31 RX ADMIN — Medication 400 MILLIGRAM(S): at 14:46

## 2024-05-31 RX ADMIN — TAMSULOSIN HYDROCHLORIDE 0.4 MILLIGRAM(S): 0.4 CAPSULE ORAL at 22:55

## 2024-05-31 RX ADMIN — MORPHINE SULFATE 4 MILLIGRAM(S): 50 CAPSULE, EXTENDED RELEASE ORAL at 14:44

## 2024-05-31 RX ADMIN — Medication 5 MILLIGRAM(S): at 19:07

## 2024-05-31 RX ADMIN — MORPHINE SULFATE 2 MILLIGRAM(S): 50 CAPSULE, EXTENDED RELEASE ORAL at 20:10

## 2024-05-31 RX ADMIN — Medication 30 MILLIGRAM(S): at 13:57

## 2024-05-31 RX ADMIN — MORPHINE SULFATE 4 MILLIGRAM(S): 50 CAPSULE, EXTENDED RELEASE ORAL at 13:55

## 2024-05-31 RX ADMIN — CYCLOBENZAPRINE HYDROCHLORIDE 10 MILLIGRAM(S): 10 TABLET, FILM COATED ORAL at 13:55

## 2024-05-31 RX ADMIN — Medication 975 MILLIGRAM(S): at 23:55

## 2024-05-31 RX ADMIN — Medication 975 MILLIGRAM(S): at 22:55

## 2024-05-31 RX ADMIN — OXYCODONE HYDROCHLORIDE 10 MILLIGRAM(S): 5 TABLET ORAL at 23:00

## 2024-05-31 NOTE — H&P ADULT - NSHPREVIEWOFSYSTEMS_GEN_ALL_CORE
REVIEW OF SYSTEMS    General:	No fevers or chills    Skin/Breast: No wounds	    Respiratory and Thorax: No CP  	  Cardiovascular:	No SOB    Gastrointestinal:	No abdominal pain    Genitourinary:	No incontinence    Musculoskeletal:	See HPI    Neurological:	See HPI  	    ROS is otherwise negative.

## 2024-05-31 NOTE — H&P ADULT - NSHPLABSRESULTS_GEN_ALL_CORE
16.0   6.77  )-----------( 227      ( 31 May 2024 09:20 )             44.9     05-31    136  |  101  |  15.9  ----------------------------<  226<H>  4.8   |  19.0<L>  |  0.71    Ca    9.1      31 May 2024 09:20    TPro  7.3  /  Alb  4.3  /  TBili  0.4  /  DBili  x   /  AST  27  /  ALT  36  /  AlkPhos  115  05-31

## 2024-05-31 NOTE — ED STATDOCS - CLINICAL SUMMARY MEDICAL DECISION MAKING FREE TEXT BOX
Acute on chronic back pain radiating to right buttock down to right calf with no bowel bladder dysfunction, no saddle anesthesia, no motor deficits.  Pain treatment and consultation with orthopedic spine Acute on chronic back pain radiating to right buttock down to right calf with no bowel bladder dysfunction, no saddle anesthesia, no motor deficits.  Pain treatment and consultation with orthopedic spine    Pt with acute on chronic back pain, scheduled to have laminectomy with Angus negrete on 6/12, spoke to ortho consulting service will admit pain control and surgery

## 2024-05-31 NOTE — CONSULT NOTE ADULT - ASSESSMENT
67y/o Male with hx of Anxiety/depression, Hemochromatosis, Bladder and prostate ca getting Keytruda infusions, he has been having worsening low back pain x 1 day. Pt says he follows with Dr Floyd and was scheduled for lumbar lami on June 12th. He has long history of low back pain and has been treating conservatively and postponed surgery to take care of his sick wife. He works as a  and continued to have pain in his back radiating down right leg. He ambulates without assistance but admits it has gotten more difficult. He is being treated for prostate and bladder cancer and often notices increased pain after his infusion treatment. He had an Keytruda infusion yesterday and the pain worsened in his lower back on the right side. He tried NSAIDs and a steroid as well as ice packs. He attempted to go to work today but the pain was too severe and he came to the ED. He is doing much better with pain meds now but still c/o low back pain and right leg pain. No numbness or tingling in extremities. He says he has mild weakness in RLE that is unchanged. No bladder or bowel incontinence. No other complaints, has no fever, chills, chest pain, nausea, vomiting, medicine consulted for medical optimization.     Plan:     Lower back pain:     - abx per ortho   - c/w IVF once NPO  - opiate induced constipation regimen   - encouraging incentive spirometry   -DVT prophylaxis and Pain meds as per Ortho team   -PT/OT and weight bearing per ortho   -further imaging like MRI as per Ortho give patient hx of prostate ca, might benefit from MRI       Hx of Depression and Anxiety: will resume home dose of Diazepam and zoloft.     Hx of Hemochromatosis: Will monitor CBC.     Hx of Prostate and bladder Ca: following Oncology and getting Keytruda infusion    Patient denies Hx of exertional dysnea or chest pain, , no personal or family hx of easy bleeding, Patient's METS Score is >4  and RCRI is 0, patient labs reviewed,  EKG showed T inversion in V2 and V3, patient is at intermediate risk for perioperative cardiovascular complication and is optimized from the medicine point of view for planned procedure, would get TTE before proceeding for procedure.      69y/o Male with hx of Anxiety/depression, Hemochromatosis, Bladder and prostate ca getting Keytruda infusions, he has been having worsening low back pain x 1 day. Pt says he follows with Dr Floyd and was scheduled for lumbar lami on June 12th. He has long history of low back pain and has been treating conservatively and postponed surgery to take care of his sick wife. He works as a  and continued to have pain in his back radiating down right leg. He ambulates without assistance but admits it has gotten more difficult. He is being treated for prostate and bladder cancer and often notices increased pain after his infusion treatment. He had an Keytruda infusion yesterday and the pain worsened in his lower back on the right side. He tried NSAIDs and a steroid as well as ice packs. He attempted to go to work today but the pain was too severe and he came to the ED. He is doing much better with pain meds now but still c/o low back pain and right leg pain. No numbness or tingling in extremities. He says he has mild weakness in RLE that is unchanged. No bladder or bowel incontinence. No other complaints, has no fever, chills, chest pain, nausea, vomiting, medicine consulted for medical optimization.     Plan:     Lower back pain:     - abx per ortho   - c/w IVF once NPO  - opiate induced constipation regimen   - encouraging incentive spirometry   -DVT prophylaxis and Pain meds as per Ortho team   -PT/OT and weight bearing per ortho   -further imaging like MRI as per Ortho give patient hx of prostate ca, might benefit from MRI       Hx of Depression and Anxiety: will resume home dose of Diazepam and zoloft.     Hx of Hemochromatosis: Will monitor CBC.     Hx of Prostate and bladder Ca: following Oncology and getting Keytruda infusion    MARY: CPAP     Patient denies Hx of exertional dysnea or chest pain, , no personal or family hx of easy bleeding, Patient's METS Score is >4  and RCRI is 0, patient labs reviewed,  EKG showed T inversion in V2 and V3, no previous EKG to compare, patient is at intermediate risk for perioperative cardiovascular complication and is optimized from the medicine point of view for planned procedure, would get TTE before proceeding for procedure.

## 2024-05-31 NOTE — ED ADULT NURSE REASSESSMENT NOTE - NS ED NURSE REASSESS COMMENT FT1
patient hypertensive asymptomatic denies head ache, visual changes, n/v  patient endorsing 7/10 back pain  contacted ortho PA who directed to nocturnist MD  Spoke with MD, as per MD pain medication to be ordered and reassess BP  Report given to on coming RN

## 2024-05-31 NOTE — ED ADULT NURSE REASSESSMENT NOTE - NS ED NURSE REASSESS COMMENT FT1
patient resting in hospital exam chair alert and oriented x4 breathing even and unlabored  patient offers no complaints at this time reports he is comfortable at this time  plan of care discussed with patient, patient verbalizes understanding

## 2024-05-31 NOTE — H&P ADULT - HISTORY OF PRESENT ILLNESS
Pt Name: TAMMIE HART    MRN: [id number]      Patient is a 68y Male presenting to the emergency department with a chief complaint of  worsening low back pain x 1 day. Pt says he follows with Dr Floyd and was scheduled for lumbar lami on June 12th. He has long history of low back pain and has been treating conservatively and postponed surgery to take care of his sick wife. He works as a  and continued to have pain in his back radiating down right leg. He ambulates without assistance but admits it has gotten more difficult. He is being treated for prostate and bladder cancer and often notices increased pain after his infusion treatment. He had an Etruda infusion yesterday and the pain worsened in his lower back on the right side. He tried NSAIDs and a steroid as well as ice packs. He attempted to go to work today but the pain was too severe and he came to the ED. He is doing much better with pain meds now but still c/o low back pain and right leg pain. No numbness or tingling in extremities. He says he has mild weakness in RLE that is unchanged. No bladder or bowel incontinence. No other complaints.

## 2024-05-31 NOTE — ED ADULT NURSE NOTE - ED STAT RN HANDOFF DETAILS
Report given to Essence MONK on [address] [place]. No apparent distress noted at this time. Vital signs stable slightly hypertensive MD aware. Respirations even and unlabored. Care transferred.

## 2024-05-31 NOTE — ED STATDOCS - ATTENDING APP SHARED VISIT CONTRIBUTION OF CARE
I, Matt Weiss, performed the initial face to face bedside interview with this patient regarding history of present illness, review of symptoms and relevant past medical, social and family history.  I completed an independent physical examination.  I was the provider who initially evaluated this patient.  Follow-up on ordered tests (ie labs, radiologic studies) and re-evaluation of the patient's status has been communicated to the ACP.  Disposition of the patient will be based on test outcome and response to ED interventions.

## 2024-05-31 NOTE — H&P ADULT - NSHPPHYSICALEXAM_GEN_ALL_CORE
Appearance: Alert, responsive, in no acute distress.  Skin: no rash on visible skin. Skin is clean, dry and intact. No bleeding. No abrasions. No ulcerations.  Vascular: 2+ distal pulses. Cap refill < 2 sec. No extremity ulcerations. No cyanosis.  Back- no deformity. Skin intact. No ecchymosis or erythema. No step off. Mild TTP lower right side.  Musculoskeletal:       Left Upper Extremity: Atraumatic with normal alignment NROM. No crepitus. No bony tenderness.      Right Upper Extremity: Atraumatic with normal alignment NROM. No crepitus. No bony tenderness.      Left Lower Extremity: Atraumatic with normal alignment NROM. No crepitus. No bony tenderness.      Right Lower Extremity: Atraumatic with normal alignment NROM. No crepitus. No bony tenderness.   Neurological: Sensation is grossly intact to light touch. No focal deficits or weaknesses found.  Pathologic reflexes: [-] Hobson,  [-]  Clonus            Motor exam:            Upper extremity              Bi(c5)  WE(c6)  EE(c7)   FF(c8)                                                R         5/5        5/5        5/5       5/5                                               L          5/5        5/5        5/5       5/5          Lower extremity          HF(l2)   KE(l3)    TA(l4)   EHL(l5)  GS(s1)                                                 R        5/5        5/5        5/5       5/5         5/5                                               L         5/5        5/5       5/5       5/5          5/5

## 2024-05-31 NOTE — PROVIDER CONTACT NOTE (OTHER) - ACTION/TREATMENT ORDERED:
Emailed video of spine class to patient as he was unable to attend in person session. Followed with Telephone review of program, all questions answered. Contact information given.

## 2024-05-31 NOTE — ED ADULT NURSE REASSESSMENT NOTE - NS ED NURSE REASSESS COMMENT FT1
Assumed care of pt from GUILHERME Diaz RN at 1930. Pt is A&Ox4. Respirations are even and unlabored. Pt hypertensive at this time, MD Nunez made aware. Pt given morphine for pain. To reassess blood pressure after receiving pain meds. Pt admitted awaiting bed placement. Pt updated on plan of care.

## 2024-05-31 NOTE — ED STATDOCS - OBJECTIVE STATEMENT
History of low back pain, scheduled for laminectomy on June 12 with Dr. Floyd.  Reports worsening pain since yesterday.  Denies trauma or injury.  Reports pain to right lower back radiating into right buttock down to right calf.  Occasional weakness in leg due to pain.  Denies numbness or tingling of leg.  Denies bowel/bladder dysfunction.  Denies saddle anesthesia.  Denies fever.  Took ibuprofen, Naprosyn and prednisone yesterday with little relief.  Last dose of medication at 6306:30 AM today: Ibuprofen 600 mg. History of low back pain, scheduled for laminectomy on June 12 with Dr. Floyd.  Reports worsening pain since yesterday.  Denies trauma or injury.  Reports pain to right lower back radiating into right buttock down to right calf.  Occasional weakness in leg due to pain.  Denies numbness or tingling of leg.  Denies bowel/bladder dysfunction.  Denies saddle anesthesia.  Denies fever.  Took ibuprofen, Naprosyn and prednisone yesterday with little relief.  Last dose of medication at 6:30 AM today: Ibuprofen 600 mg.  H/O bladder and prostate cancer under treament with Entresto.

## 2024-05-31 NOTE — ED ADULT NURSE NOTE - NSFALLUNIVINTERV_ED_ALL_ED
Bed/Stretcher in lowest position, wheels locked, appropriate side rails in place/Call bell, personal items and telephone in reach/Instruct patient to call for assistance before getting out of bed/chair/stretcher/Non-slip footwear applied when patient is off stretcher/[place] to call system/Physically safe environment - no spills, clutter or unnecessary equipment/Purposeful proactive rounding/Room/bathroom lighting operational, light cord in reach

## 2024-05-31 NOTE — ED STATDOCS - PHYSICAL EXAMINATION
nontoxic appearing, NAD, no midline thoracic or lumbar TTP, L4-S1 motor 5/5 and equal hilary, normal gait.

## 2024-05-31 NOTE — CONSULT NOTE ADULT - SUBJECTIVE AND OBJECTIVE BOX
69y/o Male with hx of Anxiety/depression, Hemochromatosis, Bladder and prostate ca getting Ktruda infusions, he has been having worsening low back pain x 1 day. Pt says he follows with Dr Floyd and was scheduled for lumbar lami on June 12th. He has long history of low back pain and has been treating conservatively and postponed surgery to take care of his sick wife. He works as a  and continued to have pain in his back radiating down right leg. He ambulates without assistance but admits it has gotten more difficult. He is being treated for prostate and bladder cancer and often notices increased pain after his infusion treatment. He had an Ktruda infusion yesterday and the pain worsened in his lower back on the right side. He tried NSAIDs and a steroid as well as ice packs. He attempted to go to work today but the pain was too severe and he came to the ED. He is doing much better with pain meds now but still c/o low back pain and right leg pain. No numbness or tingling in extremities. He says he has mild weakness in RLE that is unchanged. No bladder or bowel incontinence. No other complaints, has no fever, chills, chest pain, nausea, vomiting, medicine consulted for medical optimization.       Allergies:  	No Known Allergies:     Home Medications:   * Patient Currently Takes Medications as of 31-May-2024 14:08 documented in Structured Notes  · 	naproxen 500 mg oral delayed release tablet: Last Dose Taken:  , 1 tab(s) orally 2 times a day  · 	cyclobenzaprine 10 mg oral tablet: Last Dose Taken:  , 1 tab(s) orally every 8 hours  · 	Percocet 5 mg-325 mg oral tablet: Last Dose Taken:  , 1 tab(s) orally every 8 hours MDD: 3  .           Zolofot  .           Diazepam      PAST MEDICAL HISTORY:  Anxiety     Cancer of bladder wall     Prostate cancer.     Social History:    Not a smoker, drinker or using any drugs      Vital Signs Last 24 Hrs  T(C): 36.4 (31 May 2024 19:17), Max: 36.8 (31 May 2024 08:08)  T(F): 97.6 (31 May 2024 19:17), Max: 98.2 (31 May 2024 08:08)  HR: 69 (31 May 2024 19:17) (69 - 108)  BP: 175/113 (31 May 2024 19:17) (144/95 - 175/113)  RR: 16 (31 May 2024 19:17) (16 - 18)  SpO2: 98% (31 May 2024 19:17) (98% - 98%)    Parameters below as of 31 May 2024 19:17  Patient On (Oxygen Delivery Method): room air        PHYSICAL EXAM:    GENERAL: Middle age male looking comfortable  NECK: soft, Supple, No JVD  CHEST/LUNG: Clear to auscultate bilaterally; No wheezing  HEART: S1S2+, Regular rate and rhythm; No murmurs  ABDOMEN: Soft, Nontender, Nondistended; Bowel sounds present  EXTREMITIES:  1+ Peripheral Pulses, No edema  SKIN: No rashes or lesions  NEURO: AAOX3  PSYCH: normal mood      LABS:                        16.0   6.77  )-----------( 227      ( 31 May 2024 09:20 )             44.9     05-31    136  |  101  |  15.9  ----------------------------<  226<H>  4.8   |  19.0<L>  |  0.71    Ca    9.1      31 May 2024 09:20    TPro  7.3  /  Alb  4.3  /  TBili  0.4  /  DBili  x   /  AST  27  /  ALT  36  /  AlkPhos  115  05-31    PT/INR - ( 31 May 2024 09:20 )   PT: 9.7 sec;   INR: 0.87 ratio         PTT - ( 31 May 2024 09:20 )  PTT:28.4 sec    I&O's Summary      MEDICATIONS  (STANDING):  acetaminophen     Tablet .. 975 milliGRAM(s) Oral every 8 hours  buPROPion XL (24-Hour) . 300 milliGRAM(s) Oral daily  chlorhexidine 2% Cloths 1 Application(s) Topical daily  diazepam    Tablet 5 milliGRAM(s) Oral two times a day  enoxaparin Injectable 40 milliGRAM(s) SubCutaneous Once  sertraline 100 milliGRAM(s) Oral daily  tamsulosin 0.4 milliGRAM(s) Oral at bedtime    MEDICATIONS  (PRN):  HYDROmorphone   Tablet 4 milliGRAM(s) Oral every 4 hours PRN Severe Pain (7 - 10)  oxyCODONE    IR 10 milliGRAM(s) Oral every 3 hours PRN Moderate Pain (4 - 6)  oxyCODONE    IR 5 milliGRAM(s) Oral every 4 hours PRN Mild Pain (1 - 3)

## 2024-05-31 NOTE — PATIENT PROFILE ADULT - NSPROEXTENSIONSOFSELF_GEN_A_NUR
Duration Of Freeze Thaw-Cycle (Seconds): 5 Post-Care Instructions: I reviewed with the patient in detail post-care instructions. Patient is to wear sunprotection, and avoid picking at any of the treated lesions. Pt may apply Vaseline to crusted or scabbing areas. Number Of Freeze-Thaw Cycles: 2 freeze-thaw cycles Show Aperture Variable?: Yes Consent: The patient's consent was obtained including but not limited to risks of crusting, scabbing, blistering, scarring, darker or lighter pigmentary change, recurrence, incomplete removal and infection. Detail Level: Simple Render Note In Bullet Format When Appropriate: No eyeglasses

## 2024-05-31 NOTE — H&P ADULT - ASSESSMENT
A: 67yo male with worsening pain and radiculopathy with lumbar stenosis    -Admit to ortho  -pain control  -medicine consult Dr Munguia  -plan for OR on monday with Dr Floyd  D/W Dr Floyd A: 67yo male with worsening pain and radiculopathy with lumbar stenosis    -Admit to ortho  -pain control  -medicine consult Dr Munguia  -plan for OR on monday with Dr Floyd  D/W Dr Floyd    Attending statement:  I have personally seen this patient, and formed a face to face diagnostic evaluation on this patient on this date.  I have reviewed the PA, NP and or Medical/PA student and/or Resident documentation and agree with the history, physical exam and plan of care except if noted otherwise.      Patient is known to my service from outpatient evaluation with regard to primarily L2-3-4 and 5 lumbar stenosis.  Patient is currently under the care of [facility] receiving immunotherapy infusions for underlying cancer he just completed an immune transfusion he is cleared for surgery.  Patient presented to the emergency department on today's date just because of secondary worsening neurogenic claudication intractable leg pain back pain and urinary retention secondary to the severe spinal stenosis as noted on his MRI imaging recommending an L2-L5 lumbar laminectomy all of his questions comments concerns answered to his satisfaction and based upon worsening neurogenic claudication poor quality of life urinary retention will proceed with surgery.

## 2024-05-31 NOTE — PATIENT PROFILE ADULT - FALL HARM RISK - HARM RISK INTERVENTIONS

## 2024-05-31 NOTE — ED STATDOCS - CHIEF COMPLAINT
Called and spoke with the patient to inform her that a script for topamax was sent to her pharmacy. Patient is aware and voiced understanding.
The patient is a 68y year old Male complaining of back pain/injury.

## 2024-05-31 NOTE — ED ADULT NURSE NOTE - OBJECTIVE STATEMENT
patient presents to ED reporting worsening lower back pain that radiates to right leg since yesterday   patient endorses he is scheduled for laminectomy in june. patient denies difficulty moving bowels and urinary symptoms  patient denies numbness and tingling but reports intermittent periods of weakness.

## 2024-05-31 NOTE — ED ADULT TRIAGE NOTE - CHIEF COMPLAINT QUOTE
pt c/o low back pain, was sent by ortho, need laminectomy  A&ox3, resp wnl, pt c/o low back pain, was sent by ortho, supposed to have laminectomy 6/12, but pain is severe  A&ox3, resp wnl, ambulatory

## 2024-05-31 NOTE — ED ADULT NURSE NOTE - CHIEF COMPLAINT QUOTE
pt c/o low back pain, was sent by ortho, supposed to have laminectomy 6/12, but pain is severe  A&ox3, resp wnl, ambulatory

## 2024-06-01 LAB
GLUCOSE BLDC GLUCOMTR-MCNC: 157 MG/DL — HIGH (ref 70–99)
MRSA PCR RESULT.: SIGNIFICANT CHANGE UP
S AUREUS DNA NOSE QL NAA+PROBE: SIGNIFICANT CHANGE UP

## 2024-06-01 PROCEDURE — 99233 SBSQ HOSP IP/OBS HIGH 50: CPT | Mod: 57

## 2024-06-01 PROCEDURE — 99232 SBSQ HOSP IP/OBS MODERATE 35: CPT

## 2024-06-01 RX ADMIN — ENOXAPARIN SODIUM 40 MILLIGRAM(S): 100 INJECTION SUBCUTANEOUS at 05:51

## 2024-06-01 RX ADMIN — Medication 5 MILLIGRAM(S): at 05:51

## 2024-06-01 RX ADMIN — Medication 975 MILLIGRAM(S): at 05:51

## 2024-06-01 RX ADMIN — CHLORHEXIDINE GLUCONATE 1 APPLICATION(S): 213 SOLUTION TOPICAL at 12:50

## 2024-06-01 RX ADMIN — Medication 975 MILLIGRAM(S): at 06:21

## 2024-06-01 RX ADMIN — OXYCODONE HYDROCHLORIDE 10 MILLIGRAM(S): 5 TABLET ORAL at 21:26

## 2024-06-01 RX ADMIN — Medication 975 MILLIGRAM(S): at 21:19

## 2024-06-01 RX ADMIN — Medication 975 MILLIGRAM(S): at 12:50

## 2024-06-01 RX ADMIN — Medication 5 MILLIGRAM(S): at 17:39

## 2024-06-01 RX ADMIN — OXYCODONE HYDROCHLORIDE 10 MILLIGRAM(S): 5 TABLET ORAL at 00:00

## 2024-06-01 RX ADMIN — SERTRALINE 100 MILLIGRAM(S): 25 TABLET, FILM COATED ORAL at 12:49

## 2024-06-01 RX ADMIN — Medication 975 MILLIGRAM(S): at 13:50

## 2024-06-01 RX ADMIN — TAMSULOSIN HYDROCHLORIDE 0.4 MILLIGRAM(S): 0.4 CAPSULE ORAL at 21:19

## 2024-06-01 RX ADMIN — Medication 975 MILLIGRAM(S): at 22:19

## 2024-06-01 RX ADMIN — OXYCODONE HYDROCHLORIDE 10 MILLIGRAM(S): 5 TABLET ORAL at 22:26

## 2024-06-01 RX ADMIN — BUPROPION HYDROCHLORIDE 300 MILLIGRAM(S): 150 TABLET, EXTENDED RELEASE ORAL at 12:49

## 2024-06-01 NOTE — CONSULT NOTE ADULT - SUBJECTIVE AND OBJECTIVE BOX
67y/o Male with hx of Anxiety/depression, Hemochromatosis, Bladder and prostate ca getting Keytruda infusions, he has been having worsening low back pain x 1 day.   Pt says he follows with Dr Floyd and was scheduled for lumbar lami on .  Patient admitted with worsening pain and radiculopathy with lumbar stenosis.    Vital Signs Last 24 Hrs  T(C): 36.4 (2024 04:54), Max: 36.4 (31 May 2024 19:17)  T(F): 97.5 (2024 04:54), Max: 97.6 (31 May 2024 19:17)  HR: 56 (2024 04:54) (56 - 69)  BP: 136/75 (2024 04:54) (136/75 - 175/113)  RR: 18 (2024 04:54) (16 - 18)  SpO2: 95% (2024 04:54) (95% - 98%)  Parameters below as of 2024 04:54  Patient On (Oxygen Delivery Method): room air    GENERAL: Middle age male looking comfortable  NECK: soft, Supple, No JVD  CHEST/LUNG: Clear to auscultate bilaterally; No wheezing  HEART: S1S2+, Regular rate and rhythm; No murmurs  ABDOMEN: Soft, Nontender, Nondistended; Bowel sounds present  EXTREMITIES:  1+ Peripheral Pulses, No edema  SKIN: No rashes or lesions  NEURO: AAOX3  PSYCH: normal mood      LABS:                        16.0   6.77  )-----------( 227      ( 31 May 2024 09:20 )             44.9         136  |  101  |  15.9  ----------------------------<  226<H>  4.8   |  19.0<L>  |  0.71    Ca    9.1      31 May 2024 09:20    TPro  7.3  /  Alb  4.3  /  TBili  0.4  /  DBili  x   /  AST  27  /  ALT  36  /  AlkPhos  115      PT/INR - ( 31 May 2024 09:20 )   PT: 9.7 sec;   INR: 0.87 ratio      PTT - ( 31 May 2024 09:20 )  PTT:28.4 sec  Urinalysis Basic - ( 31 May 2024 11:28 )    Color: Yellow / Appearance: Clear / S.021 / pH: x  Gluc: x / Ketone: Negative mg/dL  / Bili: Negative / Urobili: 0.2 mg/dL   Blood: x / Protein: Negative mg/dL / Nitrite: Negative   Leuk Esterase: Small / RBC: 1 /HPF / WBC 15 /HPF   Sq Epi: x / Non Sq Epi: 0 /HPF / Bacteria: Negative /HPF    I&O's Summary    31 May 2024 07:01  -  2024 07:00  --------------------------------------------------------  IN: 200 mL / OUT: 0 mL / NET: 200 mL      MEDICATIONS  (STANDING):  acetaminophen     Tablet .. 975 milliGRAM(s) Oral every 8 hours  buPROPion XL (24-Hour) . 300 milliGRAM(s) Oral daily  chlorhexidine 2% Cloths 1 Application(s) Topical daily  diazepam    Tablet 5 milliGRAM(s) Oral two times a day  sertraline 100 milliGRAM(s) Oral daily  tamsulosin 0.4 milliGRAM(s) Oral at bedtime    MEDICATIONS  (PRN):  HYDROmorphone   Tablet 4 milliGRAM(s) Oral every 4 hours PRN Severe Pain (7 - 10)  oxyCODONE    IR 10 milliGRAM(s) Oral every 3 hours PRN Moderate Pain (4 - 6)  oxyCODONE    IR 5 milliGRAM(s) Oral every 4 hours PRN Mild Pain (1 - 3)   67y/o Male with hx of Anxiety/depression, Hemochromatosis, Bladder and prostate ca getting Keytruda infusions, he has been having worsening low back pain x 1 day.   Pt says he follows with Dr Floyd and was scheduled for lumbar lami on .  Patient admitted with worsening pain and radiculopathy with lumbar stenosis.    Vital Signs Last 24 Hrs  T(C): 36.4 (2024 04:54), Max: 36.4 (31 May 2024 19:17)  T(F): 97.5 (2024 04:54), Max: 97.6 (31 May 2024 19:17)  HR: 56 (2024 04:54) (56 - 69)  BP: 136/75 (2024 04:54) (136/75 - 175/113)  RR: 18 (2024 04:54) (16 - 18)  SpO2: 95% (2024 04:54) (95% - 98%)  Parameters below as of 2024 04:54  Patient On (Oxygen Delivery Method): room air    GENERAL: Middle age male looking comfortable  NECK: soft, Supple, No JVD  CHEST/LUNG: Clear to auscultate bilaterally; No wheezing  HEART: S1S2+, Regular rate and rhythm; No murmurs  ABDOMEN: Soft, Nontender, Nondistended; Bowel sounds present  EXTREMITIES:  1+ Peripheral Pulses, No edema  SKIN: No rashes or lesions  NEURO: AAOX3  PSYCH: normal mood    LABS:                        16.0   6.77  )-----------( 227      ( 31 May 2024 09:20 )             44.9         136  |  101  |  15.9  ----------------------------<  226<H>  4.8   |  19.0<L>  |  0.71    Ca    9.1      31 May 2024 09:20    TPro  7.3  /  Alb  4.3  /  TBili  0.4  /  DBili  x   /  AST  27  /  ALT  36  /  AlkPhos  115      PT/INR - ( 31 May 2024 09:20 )   PT: 9.7 sec;   INR: 0.87 ratio      PTT - ( 31 May 2024 09:20 )  PTT:28.4 sec  Urinalysis Basic - ( 31 May 2024 11:28 )    Color: Yellow / Appearance: Clear / S.021 / pH: x  Gluc: x / Ketone: Negative mg/dL  / Bili: Negative / Urobili: 0.2 mg/dL   Blood: x / Protein: Negative mg/dL / Nitrite: Negative   Leuk Esterase: Small / RBC: 1 /HPF / WBC 15 /HPF   Sq Epi: x / Non Sq Epi: 0 /HPF / Bacteria: Negative /HPF    I&O's Summary    31 May 2024 07:01  -  2024 07:00  --------------------------------------------------------  IN: 200 mL / OUT: 0 mL / NET: 200 mL      MEDICATIONS  (STANDING):  acetaminophen     Tablet .. 975 milliGRAM(s) Oral every 8 hours  buPROPion XL (24-Hour) . 300 milliGRAM(s) Oral daily  chlorhexidine 2% Cloths 1 Application(s) Topical daily  diazepam    Tablet 5 milliGRAM(s) Oral two times a day  sertraline 100 milliGRAM(s) Oral daily  tamsulosin 0.4 milliGRAM(s) Oral at bedtime    MEDICATIONS  (PRN):  HYDROmorphone   Tablet 4 milliGRAM(s) Oral every 4 hours PRN Severe Pain (7 - 10)  oxyCODONE    IR 10 milliGRAM(s) Oral every 3 hours PRN Moderate Pain (4 - 6)  oxyCODONE    IR 5 milliGRAM(s) Oral every 4 hours PRN Mild Pain (1 - 3)

## 2024-06-01 NOTE — PROGRESS NOTE ADULT - SUBJECTIVE AND OBJECTIVE BOX
TAMMIE NIX    [id number]                  67 yo male found to have lumbar stenosis with radiculopathy  Patient seen and examined resting comfortably sitting up in bed   Reports lower back pain improved with current medications  Denies any changes in motor or sensory to extremities  no bowel or bladder dysfunction  No new complaints     OBJECTIVE:     Vital Signs Last 24 Hrs  T(C): 37 (01 Jun 2024 09:30), Max: 37 (01 Jun 2024 09:30)  T(F): 98.6 (01 Jun 2024 09:30), Max: 98.6 (01 Jun 2024 09:30)  HR: 60 (01 Jun 2024 09:30) (56 - 69)  BP: 130/77 (01 Jun 2024 09:30) (130/77 - 175/113)  BP(mean): --  RR: 18 (01 Jun 2024 09:30) (16 - 18)  SpO2: 96% (01 Jun 2024 09:30) (95% - 98%)    Parameters below as of 01 Jun 2024 09:30  Patient On (Oxygen Delivery Method): room air    Constitutional: Alert, responsive, in no acute distress.     Abdominal: soft and supple non distended    Lymphatics: no pretibial pitting edema    Spine:            Sensation: [ ]          [ ] Upper extremity                ax        mc           m          u          r                                                         R          +           +             +           +          +                                               L           +           +             +           +          +         [ ] Lower extremity             sp         dp         saph       kumar         tibial                                                R          +           +             +           +          +                                               L           +           +             +           +          +         Pathologic reflexes: neg Hobson,  no Clonus                  Motor exam: [  ]          [ ] Upper extremity              Bi(c5)  WE(c6)  EE(c7)   FF(c8)                                                R         5/5        5/5        5/5       5/5                                               L          5/5        5/5        5/5       5/5         [ ] Lower extremity           HF(l2)   KE(l3)    TA(l4)   EHL(l5)  GS(s1)                                                 R        5/5        5/5        5/5       5/5         5/5                                               L         4/5        4/5       4/5       5/5          5/5                                                         Vascular:   B/L UE warm well perfused; capillary refill <3 seconds                                                                     B/L LE warm well perfused; capillary refill <3 seconds            LABS:                        16.0   6.77  )-----------( 227      ( 31 May 2024 09:20 )             44.9       05-31    136  |  101  |  15.9  ----------------------------<  226<H>  4.8   |  19.0<L>  |  0.71    Ca    9.1      31 May 2024 09:20    TPro  7.3  /  Alb  4.3  /  TBili  0.4  /  DBili  x   /  AST  27  /  ALT  36  /  AlkPhos  115  05-31    A/P :  68y Male with Lumbar stenosis with radiculopathy    -    Pain control as clinically indicated  -    Medical care/ clearance for surgery per Hospitalist  -    ambulate as tolerated    -    plan for OR Monday 6/3/24

## 2024-06-01 NOTE — PROGRESS NOTE ADULT - SUBJECTIVE AND OBJECTIVE BOX
TAMMIE TANNER    [id number]    68y      Male    Patient is a 68y old  Male who presents with a chief complaint of worsening back pain (31 May 2024 19:58)      INTERVAL HPI/OVERNIGHT EVENTS:    patient is doing ok, denies fever, chills, chest pain, sob, dizziness, pain is well controlled        Vital Signs Last 24 Hrs  T(C): 36.4 (2024 04:54), Max: 36.4 (31 May 2024 19:17)  T(F): 97.5 (2024 04:54), Max: 97.6 (31 May 2024 19:17)  HR: 56 (2024 04:54) (56 - 69)  BP: 136/75 (2024 04:54) (136/75 - 175/113)  RR: 18 (2024 04:54) (16 - 18)  SpO2: 95% (2024 04:54) (95% - 98%)    Parameters below as of 2024 04:54  Patient On (Oxygen Delivery Method): room air        PHYSICAL EXAM:      GENERAL: Middle age male looking comfortable  NECK: soft, Supple, No JVD  CHEST/LUNG: Clear to auscultate bilaterally; No wheezing  HEART: S1S2+, Regular rate and rhythm; No murmurs  ABDOMEN: Soft, Nontender, Nondistended; Bowel sounds present  EXTREMITIES:  1+ Peripheral Pulses, No edema  SKIN: No rashes or lesions  NEURO: AAOX3  PSYCH: normal mood        LABS:                        16.0   6.77  )-----------( 227      ( 31 May 2024 09:20 )             44.9         136  |  101  |  15.9  ----------------------------<  226<H>  4.8   |  19.0<L>  |  0.71    Ca    9.1      31 May 2024 09:20    TPro  7.3  /  Alb  4.3  /  TBili  0.4  /  DBili  x   /  AST  27  /  ALT  36  /  AlkPhos  115      PT/INR - ( 31 May 2024 09:20 )   PT: 9.7 sec;   INR: 0.87 ratio         PTT - ( 31 May 2024 09:20 )  PTT:28.4 sec  Urinalysis Basic - ( 31 May 2024 11:28 )    Color: Yellow / Appearance: Clear / S.021 / pH: x  Gluc: x / Ketone: Negative mg/dL  / Bili: Negative / Urobili: 0.2 mg/dL   Blood: x / Protein: Negative mg/dL / Nitrite: Negative   Leuk Esterase: Small / RBC: 1 /HPF / WBC 15 /HPF   Sq Epi: x / Non Sq Epi: 0 /HPF / Bacteria: Negative /HPF          I&O's Summary    31 May 2024 07:01  -  2024 07:00  --------------------------------------------------------  IN: 200 mL / OUT: 0 mL / NET: 200 mL        MEDICATIONS  (STANDING):  acetaminophen     Tablet .. 975 milliGRAM(s) Oral every 8 hours  buPROPion XL (24-Hour) . 300 milliGRAM(s) Oral daily  chlorhexidine 2% Cloths 1 Application(s) Topical daily  diazepam    Tablet 5 milliGRAM(s) Oral two times a day  sertraline 100 milliGRAM(s) Oral daily  tamsulosin 0.4 milliGRAM(s) Oral at bedtime    MEDICATIONS  (PRN):  HYDROmorphone   Tablet 4 milliGRAM(s) Oral every 4 hours PRN Severe Pain (7 - 10)  oxyCODONE    IR 10 milliGRAM(s) Oral every 3 hours PRN Moderate Pain (4 - 6)  oxyCODONE    IR 5 milliGRAM(s) Oral every 4 hours PRN Mild Pain (1 - 3)

## 2024-06-01 NOTE — CONSULT NOTE ADULT - ASSESSMENT
69y/o Male with hx of Anxiety/depression, Hemochromatosis, Bladder and prostate ca getting Keytruda infusions, he has been having worsening low back pain x 1 day.   Pt says he follows with Dr Floyd and was scheduled for lumbar lami on June 12th.  Patient admitted with worsening pain and radiculopathy with lumbar stenosis.    Radiculopathy with lumbar stenosis:  -follow-up orthopedics recommendation  -pain medications as per orthopedics   -plan is for OR on Monday with Dr. Floyd    Hx of prostate and bladder cancer:  -patient with past medical history of non muscle invasive bladder cancer, large, multifocal T1 + CIS, on Keytruda.  To keep appointment with [facility] upon discharge.  On pembrolizumab as per protocol .  Plan is for total 9 cycles.  Latest dose of Keytruda on 5/30/24  -has history of Margarita 3 + 4 = 7 prostate adenocarcinoma, rT3a (probable) N0 M0), iPSA 6.5 ng/ml, +IDC.  Being monitored for now, eventually for definitive management after 6 month cystoscopy (PSA being monitored with each dose of pembrolizumab; PSA on 2/27/24 was 2.07)    Will follow

## 2024-06-02 ENCOUNTER — TRANSCRIPTION ENCOUNTER (OUTPATIENT)
Age: 69
End: 2024-06-02

## 2024-06-02 LAB — GLUCOSE BLDC GLUCOMTR-MCNC: 127 MG/DL — HIGH (ref 70–99)

## 2024-06-02 PROCEDURE — 99233 SBSQ HOSP IP/OBS HIGH 50: CPT | Mod: 57

## 2024-06-02 RX ORDER — ENOXAPARIN SODIUM 100 MG/ML
40 INJECTION SUBCUTANEOUS ONCE
Refills: 0 | Status: COMPLETED | OUTPATIENT
Start: 2024-06-02 | End: 2024-06-02

## 2024-06-02 RX ORDER — LIDOCAINE 4 G/100G
1 CREAM TOPICAL ONCE
Refills: 0 | Status: COMPLETED | OUTPATIENT
Start: 2024-06-02 | End: 2024-06-02

## 2024-06-02 RX ADMIN — SERTRALINE 100 MILLIGRAM(S): 25 TABLET, FILM COATED ORAL at 12:11

## 2024-06-02 RX ADMIN — TAMSULOSIN HYDROCHLORIDE 0.4 MILLIGRAM(S): 0.4 CAPSULE ORAL at 21:53

## 2024-06-02 RX ADMIN — LIDOCAINE 1 PATCH: 4 CREAM TOPICAL at 17:53

## 2024-06-02 RX ADMIN — Medication 5 MILLIGRAM(S): at 17:52

## 2024-06-02 RX ADMIN — OXYCODONE HYDROCHLORIDE 10 MILLIGRAM(S): 5 TABLET ORAL at 21:53

## 2024-06-02 RX ADMIN — LIDOCAINE 1 PATCH: 4 CREAM TOPICAL at 19:00

## 2024-06-02 RX ADMIN — OXYCODONE HYDROCHLORIDE 10 MILLIGRAM(S): 5 TABLET ORAL at 22:53

## 2024-06-02 RX ADMIN — Medication 975 MILLIGRAM(S): at 05:41

## 2024-06-02 RX ADMIN — OXYCODONE HYDROCHLORIDE 10 MILLIGRAM(S): 5 TABLET ORAL at 10:01

## 2024-06-02 RX ADMIN — Medication 975 MILLIGRAM(S): at 21:53

## 2024-06-02 RX ADMIN — CHLORHEXIDINE GLUCONATE 1 APPLICATION(S): 213 SOLUTION TOPICAL at 12:12

## 2024-06-02 RX ADMIN — BUPROPION HYDROCHLORIDE 300 MILLIGRAM(S): 150 TABLET, EXTENDED RELEASE ORAL at 12:11

## 2024-06-02 RX ADMIN — Medication 975 MILLIGRAM(S): at 13:37

## 2024-06-02 RX ADMIN — Medication 5 MILLIGRAM(S): at 05:41

## 2024-06-02 RX ADMIN — ENOXAPARIN SODIUM 40 MILLIGRAM(S): 100 INJECTION SUBCUTANEOUS at 12:11

## 2024-06-02 RX ADMIN — OXYCODONE HYDROCHLORIDE 10 MILLIGRAM(S): 5 TABLET ORAL at 10:31

## 2024-06-02 RX ADMIN — Medication 975 MILLIGRAM(S): at 14:07

## 2024-06-02 RX ADMIN — Medication 975 MILLIGRAM(S): at 22:53

## 2024-06-02 RX ADMIN — Medication 975 MILLIGRAM(S): at 06:41

## 2024-06-02 NOTE — SBIRT NOTE ADULT - NSSBIRTALCPOSREINDET_GEN_A_CORE
Patient reports he has two drinks of whiskey a night. Pt states he drinks mostly for pain management as he has back pain. Pt declines concerns regarding his drinking habits and declines resources.

## 2024-06-02 NOTE — PROGRESS NOTE ADULT - SUBJECTIVE AND OBJECTIVE BOX
TAMMIE NIX    [id number]                  67 yo male found to have lumbar stenosis with radiculopathy  Patient seen and examined resting comfortably sitting up in bed   Reports lower back pain improved with current medications  Denies any changes in motor or sensory to extremities  no bowel or bladder dysfunction  No new complaints     OBJECTIVE:     Vital Signs Last 24 Hrs  T(C): 36.6 (02 Jun 2024 09:20), Max: 36.9 (01 Jun 2024 17:25)  T(F): 97.9 (02 Jun 2024 09:20), Max: 98.4 (01 Jun 2024 17:25)  HR: 78 (02 Jun 2024 09:20) (63 - 78)  BP: 148/84 (02 Jun 2024 09:20) (126/76 - 159/78)  BP(mean): --  RR: 18 (02 Jun 2024 09:20) (18 - 18)  SpO2: 97% (02 Jun 2024 09:20) (94% - 97%)    Parameters below as of 02 Jun 2024 09:20  Patient On (Oxygen Delivery Method): room air    Constitutional: Alert, responsive, in no acute distress.     Abdominal: soft and supple non distended    Lymphatics: no pretibial pitting edema    Spine:            Sensation: [ ]          [ ] Upper extremity                ax        mc           m          u          r                                                         R          +           +             +           +          +                                               L           +           +             +           +          +         [ ] Lower extremity             sp         dp         saph       kumar         tibial                                                R          +           +             +           +          +                                               L           +           +             +           +          +         Pathologic reflexes: neg Hobson,  no Clonus                  Motor exam: [  ]          [ ] Upper extremity              Bi(c5)  WE(c6)  EE(c7)   FF(c8)                                                R         5/5        5/5        5/5       5/5                                               L          5/5        5/5        5/5       5/5         [ ] Lower extremity           HF(l2)   KE(l3)    TA(l4)   EHL(l5)  GS(s1)                                                 R        5/5        5/5        5/5       5/5         5/5                                               L         4/5        4/5       4/5       5/5          5/5                                                         Vascular:   B/L UE warm well perfused; capillary refill <3 seconds                                                                     B/L LE warm well perfused; capillary refill <3 seconds     A/P :  68y Male with Lumbar stenosis with radiculopathy    -    Pain control as clinically indicated  -    Medical care/ clearance for surgery per Hospitalist  -    ambulate as tolerated   -    NPO after midnight   -    plan for OR Monday 6/3/24     TAMMIE NIX    [id number]                  67 yo male found to have lumbar stenosis with radiculopathy  Patient seen and examined resting comfortably sitting up in bed   Reports lower back pain improved with current medications  Denies any changes in motor or sensory to extremities  no bowel or bladder dysfunction  No new complaints     OBJECTIVE:     Vital Signs Last 24 Hrs  T(C): 36.6 (02 Jun 2024 09:20), Max: 36.9 (01 Jun 2024 17:25)  T(F): 97.9 (02 Jun 2024 09:20), Max: 98.4 (01 Jun 2024 17:25)  HR: 78 (02 Jun 2024 09:20) (63 - 78)  BP: 148/84 (02 Jun 2024 09:20) (126/76 - 159/78)  BP(mean): --  RR: 18 (02 Jun 2024 09:20) (18 - 18)  SpO2: 97% (02 Jun 2024 09:20) (94% - 97%)    Parameters below as of 02 Jun 2024 09:20  Patient On (Oxygen Delivery Method): room air    Constitutional: Alert, responsive, in no acute distress.     Abdominal: soft and supple non distended    Lymphatics: no pretibial pitting edema    Spine:            Sensation: [ ]          [ ] Upper extremity                ax        mc           m          u          r                                                         R          +           +             +           +          +                                               L           +           +             +           +          +         [ ] Lower extremity             sp         dp         saph       kumar         tibial                                                R          +           +             +           +          +                                               L           +           +             +           +          +         Pathologic reflexes: neg Hobson,  no Clonus                  Motor exam: [  ]          [ ] Upper extremity              Bi(c5)  WE(c6)  EE(c7)   FF(c8)                                                R         5/5        5/5        5/5       5/5                                               L          5/5        5/5        5/5       5/5         [ ] Lower extremity           HF(l2)   KE(l3)    TA(l4)   EHL(l5)  GS(s1)                                                 R        5/5        5/5        5/5       5/5         5/5                                               L         4/5        4/5       4/5       5/5          5/5                                                         Vascular:   B/L UE warm well perfused; capillary refill <3 seconds                                                                     B/L LE warm well perfused; capillary refill <3 seconds     A/P :  68y Male with Lumbar stenosis with radiculopathy    -    Pain control as clinically indicated  -    Medical care/ clearance for surgery per Hospitalist  -    Heme/Onc note appreciated  -    ambulate as tolerated   -    NPO after midnight   -    plan for OR Monday 6/3/24

## 2024-06-02 NOTE — PROGRESS NOTE ADULT - SUBJECTIVE AND OBJECTIVE BOX
69y/o Male with hx of Anxiety/depression, Hemochromatosis, Bladder and prostate ca getting Keytruda infusions, he has been having worsening low back pain x 1 day.   Pt says he follows with Dr Floyd and was scheduled for lumbar lami on June 12th.  Patient admitted with worsening pain and radiculopathy with lumbar stenosis.    6/2/24: no acute overnight events noted.  No new complaints.  Pain controlled on current regimen.    Vital Signs Last 24 Hrs  T(C): 36.5 (02 Jun 2024 04:52), Max: 37 (01 Jun 2024 09:30)  T(F): 97.7 (02 Jun 2024 04:52), Max: 98.6 (01 Jun 2024 09:30)  HR: 69 (02 Jun 2024 04:52) (60 - 73)  BP: 149/86 (02 Jun 2024 04:52) (126/76 - 159/78)  BP(mean): --  RR: 18 (02 Jun 2024 04:52) (18 - 18)  SpO2: 94% (02 Jun 2024 04:52) (94% - 96%)  Parameters below as of 02 Jun 2024 04:52  Patient On (Oxygen Delivery Method): room air    MEDICATIONS  (STANDING):  acetaminophen     Tablet .. 975 milliGRAM(s) Oral every 8 hours  buPROPion XL (24-Hour) . 300 milliGRAM(s) Oral daily  chlorhexidine 2% Cloths 1 Application(s) Topical daily  diazepam    Tablet 5 milliGRAM(s) Oral two times a day  sertraline 100 milliGRAM(s) Oral daily  tamsulosin 0.4 milliGRAM(s) Oral at bedtime    GENERAL: Middle age male looking comfortable  NECK: soft, Supple, No JVD  CHEST/LUNG: Clear to auscultate bilaterally; No wheezing  HEART: S1S2+, Regular rate and rhythm; No murmurs  ABDOMEN: Soft, Nontender, Nondistended; Bowel sounds present  EXTREMITIES:  1+ Peripheral Pulses, No edema  SKIN: No rashes or lesions  NEURO: AAOX3  PSYCH: normal mood

## 2024-06-03 ENCOUNTER — APPOINTMENT (OUTPATIENT)
Dept: ORTHOPEDIC SURGERY | Facility: HOSPITAL | Age: 69
End: 2024-06-03

## 2024-06-03 LAB
A1C WITH ESTIMATED AVERAGE GLUCOSE RESULT: 6 % — HIGH (ref 4–5.6)
ABO RH CONFIRMATION: SIGNIFICANT CHANGE UP
ALLERGY+IMMUNOLOGY DIAG STUDY NOTE: SIGNIFICANT CHANGE UP
BLD GP AB SCN SERPL QL: SIGNIFICANT CHANGE UP
DIR ANTIGLOB POLYSPECIFIC INTERPRETATION: SIGNIFICANT CHANGE UP
ESTIMATED AVERAGE GLUCOSE: 126 MG/DL — HIGH (ref 68–114)
GLUCOSE BLDC GLUCOMTR-MCNC: 108 MG/DL — HIGH (ref 70–99)
GLUCOSE BLDC GLUCOMTR-MCNC: 136 MG/DL — HIGH (ref 70–99)
GLUCOSE BLDC GLUCOMTR-MCNC: 93 MG/DL — SIGNIFICANT CHANGE UP (ref 70–99)

## 2024-06-03 PROCEDURE — 99232 SBSQ HOSP IP/OBS MODERATE 35: CPT

## 2024-06-03 PROCEDURE — 63047 LAM FACETEC & FORAMOT LUMBAR: CPT | Mod: AS

## 2024-06-03 PROCEDURE — 63048 LAM FACETEC &FORAMOT EA ADDL: CPT

## 2024-06-03 PROCEDURE — 63047 LAM FACETEC & FORAMOT LUMBAR: CPT

## 2024-06-03 PROCEDURE — 86077 PHYS BLOOD BANK SERV XMATCH: CPT

## 2024-06-03 PROCEDURE — 63048 LAM FACETEC &FORAMOT EA ADDL: CPT | Mod: AS

## 2024-06-03 RX ORDER — HYDROMORPHONE HYDROCHLORIDE 2 MG/ML
0.5 INJECTION INTRAMUSCULAR; INTRAVENOUS; SUBCUTANEOUS
Refills: 0 | Status: DISCONTINUED | OUTPATIENT
Start: 2024-06-03 | End: 2024-06-03

## 2024-06-03 RX ORDER — BUPROPION HYDROCHLORIDE 150 MG/1
300 TABLET, EXTENDED RELEASE ORAL DAILY
Refills: 0 | Status: DISCONTINUED | OUTPATIENT
Start: 2024-06-03 | End: 2024-06-06

## 2024-06-03 RX ORDER — SODIUM CHLORIDE 9 MG/ML
1000 INJECTION INTRAMUSCULAR; INTRAVENOUS; SUBCUTANEOUS
Refills: 0 | Status: DISCONTINUED | OUTPATIENT
Start: 2024-06-03 | End: 2024-06-03

## 2024-06-03 RX ORDER — HYDROMORPHONE HYDROCHLORIDE 2 MG/ML
30 INJECTION INTRAMUSCULAR; INTRAVENOUS; SUBCUTANEOUS
Refills: 0 | Status: DISCONTINUED | OUTPATIENT
Start: 2024-06-03 | End: 2024-06-04

## 2024-06-03 RX ORDER — CEFAZOLIN SODIUM 1 G
2000 VIAL (EA) INJECTION
Refills: 0 | Status: COMPLETED | OUTPATIENT
Start: 2024-06-03 | End: 2024-06-04

## 2024-06-03 RX ORDER — ONDANSETRON 8 MG/1
4 TABLET, FILM COATED ORAL EVERY 6 HOURS
Refills: 0 | Status: DISCONTINUED | OUTPATIENT
Start: 2024-06-03 | End: 2024-06-04

## 2024-06-03 RX ORDER — DEXTROSE 10 % IN WATER 10 %
125 INTRAVENOUS SOLUTION INTRAVENOUS ONCE
Refills: 0 | Status: DISCONTINUED | OUTPATIENT
Start: 2024-06-03 | End: 2024-06-06

## 2024-06-03 RX ORDER — METHOCARBAMOL 500 MG/1
750 TABLET, FILM COATED ORAL EVERY 8 HOURS
Refills: 0 | Status: DISCONTINUED | OUTPATIENT
Start: 2024-06-03 | End: 2024-06-06

## 2024-06-03 RX ORDER — SODIUM CHLORIDE 9 MG/ML
1000 INJECTION, SOLUTION INTRAVENOUS
Refills: 0 | Status: DISCONTINUED | OUTPATIENT
Start: 2024-06-03 | End: 2024-06-03

## 2024-06-03 RX ORDER — ENOXAPARIN SODIUM 100 MG/ML
40 INJECTION SUBCUTANEOUS EVERY 24 HOURS
Refills: 0 | Status: DISCONTINUED | OUTPATIENT
Start: 2024-06-04 | End: 2024-06-06

## 2024-06-03 RX ORDER — DIPHENHYDRAMINE HCL 50 MG
25 CAPSULE ORAL ONCE
Refills: 0 | Status: COMPLETED | OUTPATIENT
Start: 2024-06-03 | End: 2024-06-03

## 2024-06-03 RX ORDER — LANOLIN ALCOHOL/MO/W.PET/CERES
3 CREAM (GRAM) TOPICAL AT BEDTIME
Refills: 0 | Status: DISCONTINUED | OUTPATIENT
Start: 2024-06-03 | End: 2024-06-03

## 2024-06-03 RX ORDER — PANTOPRAZOLE SODIUM 20 MG/1
40 TABLET, DELAYED RELEASE ORAL
Refills: 0 | Status: DISCONTINUED | OUTPATIENT
Start: 2024-06-04 | End: 2024-06-06

## 2024-06-03 RX ORDER — SODIUM CHLORIDE 9 MG/ML
1000 INJECTION, SOLUTION INTRAVENOUS
Refills: 0 | Status: DISCONTINUED | OUTPATIENT
Start: 2024-06-03 | End: 2024-06-06

## 2024-06-03 RX ORDER — ONDANSETRON 8 MG/1
4 TABLET, FILM COATED ORAL ONCE
Refills: 0 | Status: DISCONTINUED | OUTPATIENT
Start: 2024-06-03 | End: 2024-06-03

## 2024-06-03 RX ORDER — HYDROMORPHONE HYDROCHLORIDE 2 MG/ML
0.5 INJECTION INTRAMUSCULAR; INTRAVENOUS; SUBCUTANEOUS EVERY 4 HOURS
Refills: 0 | Status: DISCONTINUED | OUTPATIENT
Start: 2024-06-03 | End: 2024-06-03

## 2024-06-03 RX ORDER — GABAPENTIN 400 MG/1
300 CAPSULE ORAL
Refills: 0 | Status: DISCONTINUED | OUTPATIENT
Start: 2024-06-03 | End: 2024-06-06

## 2024-06-03 RX ORDER — TAMSULOSIN HYDROCHLORIDE 0.4 MG/1
0.4 CAPSULE ORAL AT BEDTIME
Refills: 0 | Status: DISCONTINUED | OUTPATIENT
Start: 2024-06-03 | End: 2024-06-06

## 2024-06-03 RX ORDER — DEXTROSE 50 % IN WATER 50 %
25 SYRINGE (ML) INTRAVENOUS ONCE
Refills: 0 | Status: DISCONTINUED | OUTPATIENT
Start: 2024-06-03 | End: 2024-06-06

## 2024-06-03 RX ORDER — DIAZEPAM 5 MG
5 TABLET ORAL
Refills: 0 | Status: DISCONTINUED | OUTPATIENT
Start: 2024-06-03 | End: 2024-06-06

## 2024-06-03 RX ORDER — SENNA PLUS 8.6 MG/1
2 TABLET ORAL AT BEDTIME
Refills: 0 | Status: DISCONTINUED | OUTPATIENT
Start: 2024-06-04 | End: 2024-06-06

## 2024-06-03 RX ORDER — SERTRALINE 25 MG/1
100 TABLET, FILM COATED ORAL DAILY
Refills: 0 | Status: DISCONTINUED | OUTPATIENT
Start: 2024-06-03 | End: 2024-06-06

## 2024-06-03 RX ORDER — CELECOXIB 200 MG/1
200 CAPSULE ORAL EVERY 12 HOURS
Refills: 0 | Status: DISCONTINUED | OUTPATIENT
Start: 2024-06-03 | End: 2024-06-06

## 2024-06-03 RX ORDER — INSULIN LISPRO 100/ML
VIAL (ML) SUBCUTANEOUS
Refills: 0 | Status: DISCONTINUED | OUTPATIENT
Start: 2024-06-03 | End: 2024-06-06

## 2024-06-03 RX ORDER — ACETAMINOPHEN 500 MG
975 TABLET ORAL EVERY 8 HOURS
Refills: 0 | Status: DISCONTINUED | OUTPATIENT
Start: 2024-06-03 | End: 2024-06-06

## 2024-06-03 RX ORDER — SODIUM CHLORIDE 9 MG/ML
1000 INJECTION, SOLUTION INTRAVENOUS
Refills: 0 | Status: DISCONTINUED | OUTPATIENT
Start: 2024-06-04 | End: 2024-06-06

## 2024-06-03 RX ORDER — NALOXONE HYDROCHLORIDE 4 MG/.1ML
0.1 SPRAY NASAL
Refills: 0 | Status: DISCONTINUED | OUTPATIENT
Start: 2024-06-03 | End: 2024-06-04

## 2024-06-03 RX ORDER — GLUCAGON INJECTION, SOLUTION 0.5 MG/.1ML
1 INJECTION, SOLUTION SUBCUTANEOUS ONCE
Refills: 0 | Status: DISCONTINUED | OUTPATIENT
Start: 2024-06-03 | End: 2024-06-06

## 2024-06-03 RX ORDER — MAGNESIUM HYDROXIDE 400 MG/1
30 TABLET, CHEWABLE ORAL EVERY 12 HOURS
Refills: 0 | Status: DISCONTINUED | OUTPATIENT
Start: 2024-06-04 | End: 2024-06-06

## 2024-06-03 RX ORDER — DEXTROSE 50 % IN WATER 50 %
15 SYRINGE (ML) INTRAVENOUS ONCE
Refills: 0 | Status: DISCONTINUED | OUTPATIENT
Start: 2024-06-03 | End: 2024-06-06

## 2024-06-03 RX ORDER — FENTANYL CITRATE 50 UG/ML
50 INJECTION INTRAVENOUS
Refills: 0 | Status: DISCONTINUED | OUTPATIENT
Start: 2024-06-03 | End: 2024-06-03

## 2024-06-03 RX ORDER — DEXTROSE 50 % IN WATER 50 %
12.5 SYRINGE (ML) INTRAVENOUS ONCE
Refills: 0 | Status: DISCONTINUED | OUTPATIENT
Start: 2024-06-03 | End: 2024-06-06

## 2024-06-03 RX ADMIN — LIDOCAINE 1 PATCH: 4 CREAM TOPICAL at 04:29

## 2024-06-03 RX ADMIN — HYDROMORPHONE HYDROCHLORIDE 30 MILLILITER(S): 2 INJECTION INTRAMUSCULAR; INTRAVENOUS; SUBCUTANEOUS at 23:02

## 2024-06-03 RX ADMIN — OXYCODONE HYDROCHLORIDE 10 MILLIGRAM(S): 5 TABLET ORAL at 13:41

## 2024-06-03 RX ADMIN — CHLORHEXIDINE GLUCONATE 1 APPLICATION(S): 213 SOLUTION TOPICAL at 12:22

## 2024-06-03 RX ADMIN — Medication 975 MILLIGRAM(S): at 06:05

## 2024-06-03 RX ADMIN — HYDROMORPHONE HYDROCHLORIDE 0.5 MILLIGRAM(S): 2 INJECTION INTRAMUSCULAR; INTRAVENOUS; SUBCUTANEOUS at 21:11

## 2024-06-03 RX ADMIN — OXYCODONE HYDROCHLORIDE 10 MILLIGRAM(S): 5 TABLET ORAL at 14:11

## 2024-06-03 RX ADMIN — HYDROMORPHONE HYDROCHLORIDE 30 MILLILITER(S): 2 INJECTION INTRAMUSCULAR; INTRAVENOUS; SUBCUTANEOUS at 22:29

## 2024-06-03 RX ADMIN — APREPITANT 40 MILLIGRAM(S): 80 CAPSULE ORAL at 16:02

## 2024-06-03 RX ADMIN — HYDROMORPHONE HYDROCHLORIDE 0.5 MILLIGRAM(S): 2 INJECTION INTRAMUSCULAR; INTRAVENOUS; SUBCUTANEOUS at 21:35

## 2024-06-03 RX ADMIN — Medication 1 APPLICATION(S): at 15:57

## 2024-06-03 RX ADMIN — FENTANYL CITRATE 50 MICROGRAM(S): 50 INJECTION INTRAVENOUS at 21:11

## 2024-06-03 RX ADMIN — CELECOXIB 200 MILLIGRAM(S): 200 CAPSULE ORAL at 16:02

## 2024-06-03 RX ADMIN — Medication 975 MILLIGRAM(S): at 05:05

## 2024-06-03 RX ADMIN — METHOCARBAMOL 750 MILLIGRAM(S): 500 TABLET, FILM COATED ORAL at 21:44

## 2024-06-03 RX ADMIN — Medication 975 MILLIGRAM(S): at 14:11

## 2024-06-03 RX ADMIN — Medication 975 MILLIGRAM(S): at 13:41

## 2024-06-03 RX ADMIN — HYDROMORPHONE HYDROCHLORIDE 0.5 MILLIGRAM(S): 2 INJECTION INTRAMUSCULAR; INTRAVENOUS; SUBCUTANEOUS at 22:00

## 2024-06-03 RX ADMIN — FENTANYL CITRATE 50 MICROGRAM(S): 50 INJECTION INTRAVENOUS at 21:00

## 2024-06-03 RX ADMIN — Medication 5 MILLIGRAM(S): at 05:09

## 2024-06-03 RX ADMIN — CELECOXIB 200 MILLIGRAM(S): 200 CAPSULE ORAL at 16:32

## 2024-06-03 RX ADMIN — BUPROPION HYDROCHLORIDE 300 MILLIGRAM(S): 150 TABLET, EXTENDED RELEASE ORAL at 12:22

## 2024-06-03 RX ADMIN — FENTANYL CITRATE 50 MICROGRAM(S): 50 INJECTION INTRAVENOUS at 20:45

## 2024-06-03 RX ADMIN — SERTRALINE 100 MILLIGRAM(S): 25 TABLET, FILM COATED ORAL at 12:22

## 2024-06-03 RX ADMIN — Medication 25 MILLIGRAM(S): at 00:46

## 2024-06-03 RX ADMIN — SODIUM CHLORIDE 75 MILLILITER(S): 9 INJECTION, SOLUTION INTRAVENOUS at 20:46

## 2024-06-03 NOTE — BRIEF OPERATIVE NOTE - OPERATION/FINDINGS
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
General anesthesia was induced and then I personally assisted all aspects of positioning prone for a posterior lumbar approach on a modular Red table.  Lumbar spine was cleansed with Betadine by me and subsequently cleansed with DuraPrep by RN.   I participated in positioning of blue drapes and ioban and participated in operative timeout. A longitudinal incision was based over the caudal aspect of the L2 vertebral body to the cephalad aspect of the L5. I was responsible for sequential use of hand-held retractors as well as cerebellar retractors to gain access to the spinous processes.  I assisted in dissection to the spinous processes to medial borders of the corresponding facets and bilateral lamina at L3, L4, partial L5 on patient's right side at which time fluoroscopic imaging confirmed the L4 spinous process which was marked.   I then assisted with Laminectomy at  L3, L4,L5, partial L2.  I provided visualization and assisted with hemostasis throughout the procedure by using sequential retraction, continuous suction, utilization of Surgi-Dominic and packing with Ray-Cande, Bovie cauterization.  I made incision for drain cranial to surgical incision, used hemostat to pull drain through and placed drain appropriately in the lumbar area. The surgical area was irrigated copiously, prophylactic  valsalva maneuver was done without any evidence of durotomy.  Vanco powder 1000 mg placed in deep incision, vanco powder 1000 mg plus Cellerate placed superficially.  I personally assisted in Deep fascial closure was conducted with 0 Vicryl.  I personally performed closure of superficial fascia with 2-0 Vicryl, Monocryl, Dermabond, longitudinal Steri-Strips. 20 ccmarcaine 0.5% with epi local.I placed drain stitch.  Surgical site was cleansed with normal saline dried and a dry sterile dressing in the form of Mepilex was placed over the surgical incision.  xeroform, 2 x 2, Tegaderm was placed over the drain site and an island dressing was placed over the entire incisional area.

## 2024-06-03 NOTE — BRIEF OPERATIVE NOTE - NSICDXBRIEFPROCEDURE_GEN_ALL_CORE_FT
PROCEDURES:  Lumbar laminectomy 03-Jun-2024 19:40:30  David Floyd  
PROCEDURES:  Lumbar laminectomy 03-Jun-2024 19:40:30  David Floyd

## 2024-06-03 NOTE — BRIEF OPERATIVE NOTE - COMMENTS
Gabriel mendoza for normothermia neuromonitoring stable post position preop IntraOp and postop comparisons with no sustained deficiencies

## 2024-06-03 NOTE — PROGRESS NOTE ADULT - SUBJECTIVE AND OBJECTIVE BOX
TAMMIE [id number]    68yMale  Dorsalgia    Bicipital tendinitis, right shoulder    Cough, unspecified    Gross hematuria    Hemochromatosis, unspecified    Impingement syndrome of right shoulder    Localized swelling, mass and lump, right upper limb    Myalgic encephalomyelitis/chronic fatigue syndrome    Obesity, unspecified    Other intervertebral disc degeneration, lumbar region    Other specified anxiety disorders    Pain in right shoulder    Radiculopathy, lumbar region    Spinal stenosis, lumbar region with neurogenic claudication    Spinal stenosis, lumbar region without neurogenic claudication    Spondylolisthesis, lumbar region    Spondylosis without myelopathy or radiculopathy, lumbar region    Unspecified infectious disease    Handoff    MEWS Score    Cancer of bladder wall    Prostate cancer    Anxiety    Lumbar stenosis with neurogenic claudication    Lumbar stenosis with neurogenic claudication    Back pain    Lumbar laminectomy    LOWER BACK PAIN    90+    Chronic back pain    [email or web address]      STATUS POST:  lumbar laminectomy L3, L4, L5 partial L2 for lumbar stenosis with neurogenic claudication  SUBJECTIVE: Patient seen and examined doing well    Back Pain controlled, lower extremity pain resolving    OBJECTIVE:   T(C): 36.4 (06-03-24 @ 20:30), Max: 36.9 (06-03-24 @ 12:14)  HR: 85 (06-03-24 @ 20:40) (61 - 89)  BP: 127/56 (06-03-24 @ 20:40) (108/68 - 161/93)  RR: 15 (06-03-24 @ 20:40) (15 - 18)  SpO2: 96% (06-03-24 @ 20:40) (93% - 100%)   Constitutional:Pleasant in no acute distress  Psych:A&Ox3  Abdominal: soft and supple non distended  Lymphatics: no pretibial pitting edema  Spine:          Dressing:  clean/dry/intact low back                           KRUNAL drain in place, patent               Sensation:            Upper extremity          grossly intact manually          Lower extremity           grossly intact manually                               Motor:          Lower extremity                      HF(L2)   KE(L3)    TA(L4)   EHL(L5)  GS(S1)                                                 R        5/5        5/5        5/5       5/5         5/5                                               L         5/5        5/5       5/5       5/5          5/5                                                        [x] warm well perfused; capillary refill <3 seconds                A/P : 68yMale   S/P lumbar laminectomy as above  POD#0  -    Pain control- multimodal.  PCA over night due to high pain threshold.  Pain management Dr Mello should be consulted in the morning post op day 1 for further recommendations for pain control.  -    DVT ppx: [ x]SCDs[ x] Pharmacolgic  Due to cancer history, lovenox mg once daily x 28 days post op start tomorrow. Drain will likely be left prolonged due to lovenox dvtp. Discuss with Dr Floyd prior to pulling drain.   -    Periop abx:  Ancef [x ]    -    Check AM labs    -    Monitor Drain Output, can discontinue drain when output equal or less than 10 cc/hr/12 hr.  change dressing when drain pulled and as needed, honeycomb dressing.    -  Resume home meds as appropriate  -PT/OT WBAT, balance and gait  - LSO with OOB not ordered/not mandatory.  Ortho/PT team can reevaluate possible benefit for additional external support daily, in post op period.   -medical follow up- Hospitalist Service Dr Munguia  - BPH- continue flomax  - Depression/anxiety- continue prn valium.  Continue sertraline and wellbutrin.   - high BMI- nutrition consult  - MARY- , supplemental oxygen  - Uncontrolled blood sugar- nutrition consult.  sliding scale and accuchecks.  consistent carbohydrate diet.  - probable home 48-72 hours TAMMIE [id number]    68yMale  Dorsalgia    Bicipital tendinitis, right shoulder    Cough, unspecified    Gross hematuria    Hemochromatosis, unspecified    Impingement syndrome of right shoulder    Localized swelling, mass and lump, right upper limb    Myalgic encephalomyelitis/chronic fatigue syndrome    Obesity, unspecified    Other intervertebral disc degeneration, lumbar region    Other specified anxiety disorders    Pain in right shoulder    Radiculopathy, lumbar region    Spinal stenosis, lumbar region with neurogenic claudication    Spinal stenosis, lumbar region without neurogenic claudication    Spondylolisthesis, lumbar region    Spondylosis without myelopathy or radiculopathy, lumbar region    Unspecified infectious disease    Handoff    MEWS Score    Cancer of bladder wall    Prostate cancer    Anxiety    Lumbar stenosis with neurogenic claudication    Lumbar stenosis with neurogenic claudication    Back pain    Lumbar laminectomy    LOWER BACK PAIN    90+    Chronic back pain    [email or web address]      STATUS POST:  lumbar laminectomy L3, L4, L5 partial L2 for lumbar stenosis with neurogenic claudication  SUBJECTIVE: Patient seen and examined doing well    Back Pain controlled, lower extremity pain resolving    OBJECTIVE:   T(C): 36.4 (06-03-24 @ 20:30), Max: 36.9 (06-03-24 @ 12:14)  HR: 85 (06-03-24 @ 20:40) (61 - 89)  BP: 127/56 (06-03-24 @ 20:40) (108/68 - 161/93)  RR: 15 (06-03-24 @ 20:40) (15 - 18)  SpO2: 96% (06-03-24 @ 20:40) (93% - 100%)   Constitutional:Pleasant in no acute distress  Psych:A&Ox3  Abdominal: soft and supple non distended  Lymphatics: no pretibial pitting edema  Spine:          Dressing:  clean/dry/intact low back                           KRUNAL drain in place, patent               Sensation:            Upper extremity          grossly intact manually          Lower extremity           grossly intact manually                               Motor:          Lower extremity                      HF(L2)   KE(L3)    TA(L4)   EHL(L5)  GS(S1)                                                 R        5/5        5/5        5/5       5/5         5/5                                               L         5/5        5/5       5/5       5/5          5/5                                                        [x] warm well perfused; capillary refill <3 seconds                A/P : 68yMale   S/P lumbar laminectomy as above  POD#0  -    Pain control- multimodal.  PCA over night due to high pain threshold.  Pain management Dr Mello should be consulted in the morning post op day 1 for further recommendations for pain control.  -    DVT ppx: [ x]SCDs[ x] Pharmacolgic  Due to cancer history, lovenox mg once daily x 28 days post op start tomorrow. Drain will likely be left prolonged due to lovenox dvtp. Discuss with Dr Floyd prior to pulling drain.   -    Periop abx:  Ancef [x ]    -    Check AM labs    -    Monitor Drain Output, can discontinue drain when output equal or less than 10 cc/hr/12 hr.  change dressing when drain pulled and as needed, honeycomb dressing.    -  Resume home meds as appropriate  -PT/OT WBAT, balance and gait  - LSO with OOB not ordered/not mandatory.  Ortho/PT team can reevaluate possible benefit for additional external support daily, in post op period.   -medical follow up- Hospitalist Service Dr Munguia  - BPH- continue flomax  - Depression/anxiety- continue prn valium.  Continue sertraline and wellbutrin.   - high BMI- nutrition consult  - MARY- , use CPAP from home  - Uncontrolled blood sugar- nutrition consult.  sliding scale and accuchecks.  consistent carbohydrate diet.  - probable home 48-72 hours

## 2024-06-03 NOTE — PROGRESS NOTE ADULT - SUBJECTIVE AND OBJECTIVE BOX
69y/o Male with hx of Anxiety/depression, Hemochromatosis, Bladder and prostate ca getting Keytruda infusions, he has been having worsening low back pain x 1 day.   Pt says he follows with Dr Floyd and was scheduled for lumbar lami on June 12th.  Patient admitted with worsening pain and radiculopathy with lumbar stenosis.    6/3/24: Plan for OR today        ICU Vital Signs Last 24 Hrs  T(C): 36.7 (03 Jun 2024 09:27), Max: 36.9 (02 Jun 2024 13:05)  T(F): 98 (03 Jun 2024 09:27), Max: 98.5 (02 Jun 2024 13:05)  HR: 79 (03 Jun 2024 09:27) (61 - 83)  BP: 161/93 (03 Jun 2024 09:27) (108/68 - 161/93)  BP(mean): --  ABP: --  ABP(mean): --  RR: 17 (03 Jun 2024 09:27) (17 - 18)  SpO2: 95% (03 Jun 2024 09:27) (93% - 97%)    O2 Parameters below as of 03 Jun 2024 09:27  Patient On (Oxygen Delivery Method): room air    MEDICATIONS  (STANDING):  acetaminophen     Tablet .. 975 milliGRAM(s) Oral every 8 hours  buPROPion XL (24-Hour) . 300 milliGRAM(s) Oral daily  chlorhexidine 2% Cloths 1 Application(s) Topical daily  diazepam    Tablet 5 milliGRAM(s) Oral two times a day  sertraline 100 milliGRAM(s) Oral daily  tamsulosin 0.4 milliGRAM(s) Oral at bedtime    GENERAL: Middle age male looking comfortable  NECK: soft, Supple, No JVD  CHEST/LUNG: Clear to auscultate bilaterally; No wheezing  HEART: S1S2+, Regular rate and rhythm; No murmurs  ABDOMEN: Soft, Nontender, Nondistended; Bowel sounds present  EXTREMITIES:  1+ Peripheral Pulses, No edema  SKIN: No rashes or lesions  NEURO: AAOX3  PSYCH: normal mood

## 2024-06-03 NOTE — BRIEF OPERATIVE NOTE - NSICDXBRIEFPREOP_GEN_ALL_CORE_FT
PRE-OP DIAGNOSIS:  Lumbar stenosis with neurogenic claudication 03-Jun-2024 19:40:40  David Floyd  
PRE-OP DIAGNOSIS:  Lumbar stenosis with neurogenic claudication 03-Jun-2024 19:40:40  David Floyd

## 2024-06-03 NOTE — BRIEF OPERATIVE NOTE - NSICDXBRIEFPOSTOP_GEN_ALL_CORE_FT
POST-OP DIAGNOSIS:  Lumbar stenosis with neurogenic claudication 03-Jun-2024 19:40:51  David Floyd  
POST-OP DIAGNOSIS:  Lumbar stenosis with neurogenic claudication 03-Jun-2024 19:40:51  David Floyd

## 2024-06-03 NOTE — PROGRESS NOTE ADULT - SUBJECTIVE AND OBJECTIVE BOX
TAMMIE [facility]    [id number]    68y      Male    Patient is a 68y old  Male who presents with a chief complaint of worsening back pain (02 Jun 2024 10:15)      INTERVAL HPI/OVERNIGHT EVENTS:  patient's pain is well controlled, denies fever, chills, chest pain, sob      Vital Signs Last 24 Hrs  T(C): 36.7 (03 Jun 2024 09:27), Max: 36.9 (02 Jun 2024 13:05)  T(F): 98 (03 Jun 2024 09:27), Max: 98.5 (02 Jun 2024 13:05)  HR: 79 (03 Jun 2024 09:27) (61 - 83)  BP: 161/93 (03 Jun 2024 09:27) (108/68 - 161/93)  RR: 17 (03 Jun 2024 09:27) (17 - 18)  SpO2: 95% (03 Jun 2024 09:27) (93% - 97%)    Parameters below as of 03 Jun 2024 09:27  Patient On (Oxygen Delivery Method): room air        PHYSICAL EXAM:    GENERAL: Middle age male looking comfortable  NECK: soft, Supple, No JVD  CHEST/LUNG: Clear to auscultate bilaterally; No wheezing  HEART: S1S2+, Regular rate and rhythm; No murmurs  ABDOMEN: Soft, Nontender, Nondistended; Bowel sounds present  EXTREMITIES:  1+ Peripheral Pulses, No edema  SKIN: No rashes or lesions  NEURO: AAOX3  PSYCH: normal mood      MEDICATIONS  (STANDING):  acetaminophen     Tablet .. 975 milliGRAM(s) Oral every 8 hours  acetaminophen     Tablet .. 975 milliGRAM(s) Oral once  aprepitant 40 milliGRAM(s) Oral once  buPROPion XL (24-Hour) . 300 milliGRAM(s) Oral daily  ceFAZolin  Injectable. 2000 milliGRAM(s) IV Push once  celecoxib 200 milliGRAM(s) Oral once  chlorhexidine 2% Cloths 1 Application(s) Topical daily  diazepam    Tablet 5 milliGRAM(s) Oral two times a day  povidone iodine 5% Nasal Swab 1 Application(s) Both Nostrils once  sertraline 100 milliGRAM(s) Oral daily  sodium chloride 0.9%. 1000 milliLiter(s) (125 mL/Hr) IV Continuous <Continuous>  tamsulosin 0.4 milliGRAM(s) Oral at bedtime    MEDICATIONS  (PRN):  HYDROmorphone   Tablet 4 milliGRAM(s) Oral every 4 hours PRN Severe Pain (7 - 10)  melatonin 3 milliGRAM(s) Oral at bedtime PRN Insomnia  oxyCODONE    IR 10 milliGRAM(s) Oral every 3 hours PRN Moderate Pain (4 - 6)  oxyCODONE    IR 5 milliGRAM(s) Oral every 4 hours PRN Mild Pain (1 - 3)

## 2024-06-04 ENCOUNTER — TRANSCRIPTION ENCOUNTER (OUTPATIENT)
Age: 69
End: 2024-06-04

## 2024-06-04 LAB
GLUCOSE BLDC GLUCOMTR-MCNC: 114 MG/DL — HIGH (ref 70–99)
GLUCOSE BLDC GLUCOMTR-MCNC: 117 MG/DL — HIGH (ref 70–99)
GLUCOSE BLDC GLUCOMTR-MCNC: 119 MG/DL — HIGH (ref 70–99)
GLUCOSE BLDC GLUCOMTR-MCNC: 99 MG/DL — SIGNIFICANT CHANGE UP (ref 70–99)

## 2024-06-04 PROCEDURE — 99232 SBSQ HOSP IP/OBS MODERATE 35: CPT

## 2024-06-04 RX ORDER — OXYCODONE HYDROCHLORIDE 5 MG/1
5 TABLET ORAL EVERY 4 HOURS
Refills: 0 | Status: DISCONTINUED | OUTPATIENT
Start: 2024-06-04 | End: 2024-06-06

## 2024-06-04 RX ORDER — DIPHENHYDRAMINE HCL 50 MG
25 CAPSULE ORAL ONCE
Refills: 0 | Status: COMPLETED | OUTPATIENT
Start: 2024-06-04 | End: 2024-06-04

## 2024-06-04 RX ORDER — ONDANSETRON 8 MG/1
4 TABLET, FILM COATED ORAL EVERY 6 HOURS
Refills: 0 | Status: DISCONTINUED | OUTPATIENT
Start: 2024-06-04 | End: 2024-06-04

## 2024-06-04 RX ORDER — LANOLIN ALCOHOL/MO/W.PET/CERES
3 CREAM (GRAM) TOPICAL AT BEDTIME
Refills: 0 | Status: DISCONTINUED | OUTPATIENT
Start: 2024-06-04 | End: 2024-06-06

## 2024-06-04 RX ORDER — OXYCODONE HYDROCHLORIDE 5 MG/1
10 TABLET ORAL EVERY 4 HOURS
Refills: 0 | Status: DISCONTINUED | OUTPATIENT
Start: 2024-06-04 | End: 2024-06-06

## 2024-06-04 RX ORDER — LANOLIN ALCOHOL/MO/W.PET/CERES
3 CREAM (GRAM) TOPICAL AT BEDTIME
Refills: 0 | Status: DISCONTINUED | OUTPATIENT
Start: 2024-06-04 | End: 2024-06-04

## 2024-06-04 RX ORDER — HYDROMORPHONE HYDROCHLORIDE 2 MG/ML
4 INJECTION INTRAMUSCULAR; INTRAVENOUS; SUBCUTANEOUS EVERY 4 HOURS
Refills: 0 | Status: DISCONTINUED | OUTPATIENT
Start: 2024-06-04 | End: 2024-06-06

## 2024-06-04 RX ADMIN — GABAPENTIN 300 MILLIGRAM(S): 400 CAPSULE ORAL at 17:06

## 2024-06-04 RX ADMIN — Medication 2000 MILLIGRAM(S): at 00:26

## 2024-06-04 RX ADMIN — GABAPENTIN 300 MILLIGRAM(S): 400 CAPSULE ORAL at 05:37

## 2024-06-04 RX ADMIN — SERTRALINE 100 MILLIGRAM(S): 25 TABLET, FILM COATED ORAL at 11:07

## 2024-06-04 RX ADMIN — BUPROPION HYDROCHLORIDE 300 MILLIGRAM(S): 150 TABLET, EXTENDED RELEASE ORAL at 11:07

## 2024-06-04 RX ADMIN — METHOCARBAMOL 750 MILLIGRAM(S): 500 TABLET, FILM COATED ORAL at 13:04

## 2024-06-04 RX ADMIN — Medication 2000 MILLIGRAM(S): at 08:41

## 2024-06-04 RX ADMIN — PANTOPRAZOLE SODIUM 40 MILLIGRAM(S): 20 TABLET, DELAYED RELEASE ORAL at 05:37

## 2024-06-04 RX ADMIN — CELECOXIB 200 MILLIGRAM(S): 200 CAPSULE ORAL at 06:30

## 2024-06-04 RX ADMIN — Medication 25 MILLIGRAM(S): at 01:05

## 2024-06-04 RX ADMIN — Medication 25 MILLIGRAM(S): at 20:56

## 2024-06-04 RX ADMIN — Medication 975 MILLIGRAM(S): at 00:30

## 2024-06-04 RX ADMIN — ENOXAPARIN SODIUM 40 MILLIGRAM(S): 100 INJECTION SUBCUTANEOUS at 05:37

## 2024-06-04 RX ADMIN — HYDROMORPHONE HYDROCHLORIDE 30 MILLILITER(S): 2 INJECTION INTRAMUSCULAR; INTRAVENOUS; SUBCUTANEOUS at 07:45

## 2024-06-04 RX ADMIN — Medication 975 MILLIGRAM(S): at 13:04

## 2024-06-04 RX ADMIN — METHOCARBAMOL 750 MILLIGRAM(S): 500 TABLET, FILM COATED ORAL at 21:54

## 2024-06-04 RX ADMIN — Medication 975 MILLIGRAM(S): at 09:40

## 2024-06-04 RX ADMIN — TAMSULOSIN HYDROCHLORIDE 0.4 MILLIGRAM(S): 0.4 CAPSULE ORAL at 21:53

## 2024-06-04 RX ADMIN — METHOCARBAMOL 750 MILLIGRAM(S): 500 TABLET, FILM COATED ORAL at 05:37

## 2024-06-04 RX ADMIN — OXYCODONE HYDROCHLORIDE 10 MILLIGRAM(S): 5 TABLET ORAL at 20:56

## 2024-06-04 RX ADMIN — Medication 975 MILLIGRAM(S): at 01:00

## 2024-06-04 RX ADMIN — Medication 975 MILLIGRAM(S): at 21:53

## 2024-06-04 RX ADMIN — Medication 5 MILLIGRAM(S): at 05:37

## 2024-06-04 RX ADMIN — Medication 975 MILLIGRAM(S): at 08:40

## 2024-06-04 RX ADMIN — Medication 5 MILLIGRAM(S): at 17:06

## 2024-06-04 RX ADMIN — Medication 975 MILLIGRAM(S): at 22:53

## 2024-06-04 RX ADMIN — OXYCODONE HYDROCHLORIDE 10 MILLIGRAM(S): 5 TABLET ORAL at 21:56

## 2024-06-04 RX ADMIN — CELECOXIB 200 MILLIGRAM(S): 200 CAPSULE ORAL at 17:06

## 2024-06-04 RX ADMIN — Medication 975 MILLIGRAM(S): at 14:04

## 2024-06-04 RX ADMIN — CELECOXIB 200 MILLIGRAM(S): 200 CAPSULE ORAL at 05:37

## 2024-06-04 RX ADMIN — SODIUM CHLORIDE 75 MILLILITER(S): 9 INJECTION, SOLUTION INTRAVENOUS at 05:36

## 2024-06-04 RX ADMIN — CELECOXIB 200 MILLIGRAM(S): 200 CAPSULE ORAL at 17:36

## 2024-06-04 RX ADMIN — TAMSULOSIN HYDROCHLORIDE 0.4 MILLIGRAM(S): 0.4 CAPSULE ORAL at 00:38

## 2024-06-04 NOTE — DISCHARGE NOTE PROVIDER - CARE PROVIDER_API CALL
David Floyd  Orthopaedic Surgery  [address] [place] [id number]  Phone: [phone]  Fax: [phone]  Follow Up Time:

## 2024-06-04 NOTE — OCCUPATIONAL THERAPY INITIAL EVALUATION ADULT - LIVES WITH, PROFILE
Pt reports lives in a ranch style house with step daughter who is of no assistance. Pt states will be discharging to his brothers house. 5-6 BARAK with handrails, no steps inside to bath, full flight with handrail to full bath. Pt states brother and sister in law able to assist with needs/other relative

## 2024-06-04 NOTE — PHYSICAL THERAPY INITIAL EVALUATION ADULT - ADDITIONAL COMMENTS
Pt reports independent PTA, owns RW, SAC. Pt will be staying with brother upon discharge, 8 BARAK with handrail, no steps inside.

## 2024-06-04 NOTE — OCCUPATIONAL THERAPY INITIAL EVALUATION ADULT - VISUAL ACUITY
+distance glasses; no complaints in vision offered. Central and peripheral fields intact bialtearlly

## 2024-06-04 NOTE — CHART NOTE - NSCHARTNOTEFT_GEN_A_CORE
RN called due to mild bloody saturation around surgical drain site. Mepilex and gauze around drain removed, no active bleeding, incision C/D/I, new gauze placed around drain and new Mepilex applied over incision. No other complaints. continue to monitor drain output.
Nutrition Note:   Pt with good po intake and no weight changes.   Consulted for diet education.   Met with pt who reports no hx DM, however was on steroids with BG levels of 200 noted.  Spoke with pt regarding Cons CHO diet education and reviewed recommended foods. Pt now off steroids.  Pt refusing education packet.     Pt does follow a healthy, cons CHO diet at home.   Encourage po intake, monitor diet tolerance, and provide assistance at meals as needed.   RD to be available as needed.

## 2024-06-04 NOTE — CONSULT NOTE ADULT - ASSESSMENT
68M  chronic back pain s/p Lumbar laminectomy 03-Jun-2024    DC PCA  - Recommend starting oxycodone PO 5mg/10mg [id number] PRN mod/severe pain  cont other non-opioid analgesics as ordered

## 2024-06-04 NOTE — DISCHARGE NOTE PROVIDER - NSDCFUADDINST_GEN_ALL_CORE_FT
Follow-up with Dr. Floyd within 7-10 days. Keep dressing on until office follow-up, call office if dressing becomes saturated.. Pain medications as indicated and titrated to patient's needs. Patient will begin aspirin 325mg twice daily for 4 weeks post-operatively for clot prevention.  LSO brace as needed for comfort when out of bed. Ambulate with assistance of walker. Contact office if the wound becomes red, opens or discharge increases. Follow-up with Dr. Floyd within 7-10 days. Keep dressing on until office follow-up, call office if dressing becomes saturated.. Pain medications as indicated and titrated to patient's needs. Patient will begin eliquis twice daily for 4 weeks post-operatively for clot prevention.  LSO brace as needed for comfort when out of bed. Ambulate with assistance of walker. Contact office if the wound becomes red, opens or discharge increases. Follow-up with Dr. Floyd within 7-10 days. Keep dressing on until office follow-up, call office if dressing becomes saturated.. Pain medications as indicated and titrated to patient's needs. Patient will begin eliquis on 6/8/24 twice daily for 4 weeks post-operatively for clot prevention.  LSO brace as needed for comfort when out of bed. Ambulate with assistance of walker. Contact office if the wound becomes red, opens or discharge increases. Follow-up with Dr. Floyd within 7-10 days. Keep dressing on until office follow-up, call office if dressing becomes saturated.. Pain medications as indicated and titrated to patient's needs. Patient will begin Aspirin 325mg daily x 3 days than start eliquis on 6/9/24 twice daily for 4 weeks post-operatively for clot prevention.  LSO brace as needed for comfort when out of bed. Ambulate with assistance of walker. Contact office if the wound becomes red, opens or discharge increases.

## 2024-06-04 NOTE — PHYSICAL THERAPY INITIAL EVALUATION ADULT - PERTINENT HX OF CURRENT PROBLEM, REHAB EVAL
Pt is 67y/o Male with hx of Anxiety/depression, Hemochromatosis, Bladder and prostate ca getting Keytruda infusions, he has been having worsening low back pain x 1 day. Now s/p lumbar lami 6/4/24

## 2024-06-04 NOTE — DISCHARGE NOTE PROVIDER - HOSPITAL COURSE
Patient was admitted for lumbar laminectomy L3, L4, L5 partial L2 on 6/4/24 for lumbar stenosis with neurogenic claudication. The hospital post operative course was uneventful.  The surgical dressing was changed and the drain was removed uneventfully.   PT/OT worked with patient for gait training.   Pain was now adequately controlled and patient was discharged home in stable condition.  Chronic medical conditions were treated during the hospital stay.

## 2024-06-04 NOTE — DISCHARGE NOTE PROVIDER - NSDCFUSCHEDAPPT_GEN_ALL_CORE_FT
David Floyd  [facility]  Scheduled Appointment: 06/04/2024    [facility]  ORTHOSURG [address]  Scheduled Appointment: 06/12/2024    David Floyd  [facility]  Scheduled Appointment: 06/12/2024    David Floyd Physician [facility]  ORTHOSURG [address]  Scheduled Appointment: 06/25/2024     David Floyd Physician [facility]  ORTHOSURG [address]  Scheduled Appointment: 06/12/2024    David Floyd  [facility]  Scheduled Appointment: 06/12/2024    David Floyd Physician [facility]  ORTHOSURG [address]  Scheduled Appointment: 06/25/2024

## 2024-06-04 NOTE — PROGRESS NOTE ADULT - SUBJECTIVE AND OBJECTIVE BOX
TAMMIE [facility]    [id number]    68y      Male    Patient is a 68y old  Male who presents with a chief complaint of worsening back pain (04 Jun 2024 09:56)      INTERVAL HPI/OVERNIGHT EVENTS:    patient is s/p surgery, Patient is doing ok, pain is well managed with pain medications, has no fever, chills, chest pain, SOB, nausea, vomiting, constipation.       Vital Signs Last 24 Hrs  T(C): 36.7 (04 Jun 2024 08:31), Max: 36.9 (03 Jun 2024 12:14)  T(F): 98 (04 Jun 2024 08:31), Max: 98.5 (03 Jun 2024 12:14)  HR: 66 (04 Jun 2024 08:31) (66 - 89)  BP: 128/76 (04 Jun 2024 08:31) (114/70 - 157/75)  BP(mean): 87 (03 Jun 2024 22:35) (78 - 101)  RR: 18 (04 Jun 2024 08:31) (14 - 18)  SpO2: 93% (04 Jun 2024 08:31) (93% - 100%)    Parameters below as of 04 Jun 2024 08:31  Patient On (Oxygen Delivery Method): room air        PHYSICAL EXAM:    GENERAL: Middle age male looking comfortable  NECK: soft, Supple, No JVD  CHEST/LUNG: Clear to auscultate bilaterally; No wheezing  HEART: S1S2+, Regular rate and rhythm; No murmurs  ABDOMEN: Soft, Nontender, Nondistended; Bowel sounds present  EXTREMITIES:  1+ Peripheral Pulses, No edema  SKIN: No rashes or lesions  NEURO: AAOX3  PSYCH: normal mood        MEDICATIONS  (STANDING):  acetaminophen     Tablet .. 975 milliGRAM(s) Oral every 8 hours  buPROPion XL (24-Hour) . 300 milliGRAM(s) Oral daily  celecoxib 200 milliGRAM(s) Oral every 12 hours  dextrose 10% Bolus 125 milliLiter(s) IV Bolus once  dextrose 5%. 1000 milliLiter(s) (100 mL/Hr) IV Continuous <Continuous>  dextrose 5%. 1000 milliLiter(s) (50 mL/Hr) IV Continuous <Continuous>  dextrose 50% Injectable 25 Gram(s) IV Push once  dextrose 50% Injectable 12.5 Gram(s) IV Push once  diazepam    Tablet 5 milliGRAM(s) Oral two times a day  enoxaparin Injectable 40 milliGRAM(s) SubCutaneous every 24 hours  gabapentin 300 milliGRAM(s) Oral two times a day  glucagon  Injectable 1 milliGRAM(s) IntraMuscular once  HYDROmorphone PCA (1 mG/mL) 30 milliLiter(s) PCA Continuous PCA Continuous  insulin lispro (ADMELOG) corrective regimen sliding scale   SubCutaneous three times a day before meals  lactated ringers. 1000 milliLiter(s) (75 mL/Hr) IV Continuous <Continuous>  methocarbamol 750 milliGRAM(s) Oral every 8 hours  pantoprazole    Tablet 40 milliGRAM(s) Oral before breakfast  senna 2 Tablet(s) Oral at bedtime  sertraline 100 milliGRAM(s) Oral daily  tamsulosin 0.4 milliGRAM(s) Oral at bedtime    MEDICATIONS  (PRN):  aluminum hydroxide/magnesium hydroxide/simethicone Suspension 30 milliLiter(s) Oral every 12 hours PRN Indigestion  dextrose Oral Gel 15 Gram(s) Oral once PRN Blood Glucose LESS THAN 70 milliGRAM(s)/deciliter  magnesium hydroxide Suspension 30 milliLiter(s) Oral every 12 hours PRN Constipation  melatonin 3 milliGRAM(s) Oral at bedtime PRN Insomnia  naloxone Injectable 0.1 milliGRAM(s) IV Push every 3 minutes PRN For ANY of the following changes in patient status:  A. RR LESS THAN 10 breaths per minute, B. Oxygen saturation LESS THAN 90%, C. Sedation score of 6  ondansetron Injectable 4 milliGRAM(s) IV Push every 6 hours PRN Nausea

## 2024-06-04 NOTE — CONSULT NOTE ADULT - SUBJECTIVE AND OBJECTIVE BOX
Patient is a 68y old  Male who presents with a chief complaint of worsening back pain (04 Jun 2024 10:52)      HPI:  Pt Name: TAMMIE HART    MRN: [id number]      Patient is a 68y Male presenting to the emergency department with a chief complaint of  worsening low back pain x 1 day. Pt says he follows with Dr Floyd and was scheduled for lumbar lami on June 12th. He has long history of low back pain and has been treating conservatively and postponed surgery to take care of his sick wife. He works as a  and continued to have pain in his back radiating down right leg. He ambulates without assistance but admits it has gotten more difficult. He is being treated for prostate and bladder cancer and often notices increased pain after his infusion treatment. He had an Etruda infusion yesterday and the pain worsened in his lower back on the right side. He tried NSAIDs and a steroid as well as ice packs. He attempted to go to work today but the pain was too severe and he came to the ED. He is doing much better with pain meds now but still c/o low back pain and right leg pain. No numbness or tingling in extremities. He says he has mild weakness in RLE that is unchanged. No bladder or bowel incontinence. No other complaints.          Analgesic Dosing for past 24 hours reviewed as below:  acetaminophen     Tablet ..   975 milliGRAM(s) Oral (06-04-24 @ 08:40)   975 milliGRAM(s) Oral (06-04-24 @ 00:30)    acetaminophen     Tablet ..   975 milliGRAM(s) Oral (06-03-24 @ 13:41)    aprepitant   40 milliGRAM(s) Oral (06-03-24 @ 16:02)    buPROPion XL (24-Hour) .   300 milliGRAM(s) Oral (06-04-24 @ 11:07)    celecoxib   200 milliGRAM(s) Oral (06-03-24 @ 16:02)    celecoxib   200 milliGRAM(s) Oral (06-04-24 @ 05:37)    diazepam    Tablet   5 milliGRAM(s) Oral (06-04-24 @ 05:37)    diphenhydrAMINE   25 milliGRAM(s) Oral (06-04-24 @ 01:05)    fentaNYL    Injectable   50 MICROGram(s) IV Push (06-03-24 @ 21:00)   50 MICROGram(s) IV Push (06-03-24 @ 20:45)    gabapentin   300 milliGRAM(s) Oral (06-04-24 @ 05:37)    HYDROmorphone  Injectable   0.5 milliGRAM(s) IV Push (06-03-24 @ 21:35)   0.5 milliGRAM(s) IV Push (06-03-24 @ 21:11)    methocarbamol   750 milliGRAM(s) Oral (06-04-24 @ 05:37)   750 milliGRAM(s) Oral (06-03-24 @ 21:44)    oxyCODONE    IR   10 milliGRAM(s) Oral (06-03-24 @ 13:41)    sertraline   100 milliGRAM(s) Oral (06-04-24 @ 11:07)          T(C): 36.7 (06-04-24 @ 08:31), Max: 36.7 (06-03-24 @ 23:00)  HR: 66 (06-04-24 @ 08:31) (66 - 89)  BP: 128/76 (06-04-24 @ 08:31) (114/70 - 157/75)  RR: 18 (06-04-24 @ 08:31) (14 - 18)  SpO2: 93% (06-04-24 @ 08:31) (93% - 100%)      06-03-24 @ 07:01  -  06-04-24 @ 07:00  --------------------------------------------------------  IN: 830 mL / OUT: 2715 mL / NET: -1885 mL    06-04-24 @ 07:01  -  06-04-24 @ 12:46  --------------------------------------------------------  IN: 0 mL / OUT: 90 mL / NET: -90 mL        acetaminophen     Tablet .. 975 milliGRAM(s) Oral every 8 hours  aluminum hydroxide/magnesium hydroxide/simethicone Suspension 30 milliLiter(s) Oral every 12 hours PRN  buPROPion XL (24-Hour) . 300 milliGRAM(s) Oral daily  celecoxib 200 milliGRAM(s) Oral every 12 hours  dextrose 10% Bolus 125 milliLiter(s) IV Bolus once  dextrose 5%. 1000 milliLiter(s) IV Continuous <Continuous>  dextrose 5%. 1000 milliLiter(s) IV Continuous <Continuous>  dextrose 50% Injectable 25 Gram(s) IV Push once  dextrose 50% Injectable 12.5 Gram(s) IV Push once  dextrose Oral Gel 15 Gram(s) Oral once PRN  diazepam    Tablet 5 milliGRAM(s) Oral two times a day  enoxaparin Injectable 40 milliGRAM(s) SubCutaneous every 24 hours  gabapentin 300 milliGRAM(s) Oral two times a day  glucagon  Injectable 1 milliGRAM(s) IntraMuscular once  HYDROmorphone PCA (1 mG/mL) 30 milliLiter(s) PCA Continuous PCA Continuous  insulin lispro (ADMELOG) corrective regimen sliding scale   SubCutaneous three times a day before meals  lactated ringers. 1000 milliLiter(s) IV Continuous <Continuous>  magnesium hydroxide Suspension 30 milliLiter(s) Oral every 12 hours PRN  melatonin 3 milliGRAM(s) Oral at bedtime PRN  methocarbamol 750 milliGRAM(s) Oral every 8 hours  naloxone Injectable 0.1 milliGRAM(s) IV Push every 3 minutes PRN  ondansetron Injectable 4 milliGRAM(s) IV Push every 6 hours PRN  pantoprazole    Tablet 40 milliGRAM(s) Oral before breakfast  senna 2 Tablet(s) Oral at bedtime  sertraline 100 milliGRAM(s) Oral daily  tamsulosin 0.4 milliGRAM(s) Oral at bedtime                  Pain Service   [phone]                                 (31 May 2024 15:54)

## 2024-06-04 NOTE — OCCUPATIONAL THERAPY INITIAL EVALUATION ADULT - GENERAL OBSERVATIONS, REHAB EVAL
Left pt as received OOB in chair, NAD, +IV, +KRUNAL, +Tele/ on RA A&Ox4. Pre/post pain 6/10, back; RN aware. Pt agreeable to OT evaluation

## 2024-06-04 NOTE — OCCUPATIONAL THERAPY INITIAL EVALUATION ADULT - GROSSLY INTACT, SENSORY
Grossly Intact

## 2024-06-04 NOTE — DISCHARGE NOTE PROVIDER - NSDCMRMEDTOKEN_GEN_ALL_CORE_FT
cyclobenzaprine 10 mg oral tablet: 1 tab(s) orally every 8 hours  naproxen 500 mg oral delayed release tablet: 1 tab(s) orally 2 times a day  Percocet 5 mg-325 mg oral tablet: 1 tab(s) orally every 8 hours MDD: 3   buPROPion 300 mg/24 hours (XL) oral tablet, extended release: 1 tab(s) orally once a day  celecoxib 200 mg oral capsule: 1 cap(s) orally every 12 hours  cyclobenzaprine 10 mg oral tablet: 1 tab(s) orally every 8 hours  Lovenox 40 mg/0.4 mL injectable solution: 40 milligram(s) subcutaneously once a day  oxyCODONE 5 mg oral tablet: 1 tab(s) orally every 6 hours as needed for Mild Pain (1 - 3) MDD: 4 tabs  senna leaf extract oral tablet: 2 tab(s) orally once a day (at bedtime)  sertraline 100 mg oral tablet: 1 tab(s) orally once a day   buPROPion 300 mg/24 hours (XL) oral tablet, extended release: 1 tab(s) orally once a day  celecoxib 200 mg oral capsule: 1 cap(s) orally every 12 hours  cyclobenzaprine 10 mg oral tablet: 1 tab(s) orally every 8 hours  Eliquis 2.5 mg oral tablet: 1 tab(s) orally 2 times a day  oxyCODONE 5 mg oral tablet: 1 tab(s) orally every 6 hours as needed for Mild Pain (1 - 3) MDD: 4 tabs  senna leaf extract oral tablet: 2 tab(s) orally once a day (at bedtime)  sertraline 100 mg oral tablet: 1 tab(s) orally once a day   aspirin 325 mg oral delayed release tablet: 1 tab(s) orally once a day  buPROPion 300 mg/24 hours (XL) oral tablet, extended release: 1 tab(s) orally once a day  celecoxib 200 mg oral capsule: 1 cap(s) orally every 12 hours  cyclobenzaprine 10 mg oral tablet: 1 tab(s) orally every 8 hours  Eliquis 2.5 mg oral tablet: 1 tab(s) orally 2 times a day  oxyCODONE 5 mg oral tablet: 1 tab(s) orally every 6 hours as needed for Mild Pain (1 - 3) MDD: 4 tabs  senna leaf extract oral tablet: 2 tab(s) orally once a day (at bedtime)  sertraline 100 mg oral tablet: 1 tab(s) orally once a day

## 2024-06-04 NOTE — OCCUPATIONAL THERAPY INITIAL EVALUATION ADULT - DIAGNOSIS, OT EVAL
68 year old Male s/p Lumbar lami L3 L4 L5 partial L2 for lumbar stenosis with neurogenic claudication (6/3)

## 2024-06-04 NOTE — OCCUPATIONAL THERAPY INITIAL EVALUATION ADULT - ADDITIONAL COMMENTS
Pt plans to sponge bathe upon discharge. Pt brother house has Tub with curtains. Pt independent PTA without device. Pt owns RW and cane. Pt is right handed and drives.

## 2024-06-04 NOTE — DISCHARGE NOTE PROVIDER - NSDCCPCAREPLAN_GEN_ALL_CORE_FT
PRINCIPAL DISCHARGE DIAGNOSIS  Diagnosis: Back pain  Assessment and Plan of Treatment:       SECONDARY DISCHARGE DIAGNOSES  Diagnosis: Chronic back pain  Assessment and Plan of Treatment:

## 2024-06-04 NOTE — PROGRESS NOTE ADULT - SUBJECTIVE AND OBJECTIVE BOX
TAMMIE NIX  [id number]    POST OPERATIVE DAY #:  1  STATUS POST: Lumbar lami L3-L5 partial L2                 SUBJECTIVE: Patient seen and examined at bedside. Patient resting in bed. Patient has not participated with PT yet but plans to this morning. Denies motor/sensory changes. Voiding without complications. Denies numbness, tingling, CP, SOB.         OBJECTIVE:     Vital Signs Last 24 Hrs  T(C): 36.7 (04 Jun 2024 08:31), Max: 36.9 (03 Jun 2024 12:14)  T(F): 98 (04 Jun 2024 08:31), Max: 98.5 (03 Jun 2024 12:14)  HR: 66 (04 Jun 2024 08:31) (66 - 89)  BP: 128/76 (04 Jun 2024 08:31) (114/70 - 157/75)  BP(mean): 87 (03 Jun 2024 22:35) (78 - 101)  RR: 18 (04 Jun 2024 08:31) (14 - 18)  SpO2: 93% (04 Jun 2024 08:31) (93% - 100%)    Parameters below as of 04 Jun 2024 08:31  Patient On (Oxygen Delivery Method): room air      I&O's Summary    03 Jun 2024 07:01  -  04 Jun 2024 07:00  --------------------------------------------------------  IN: 830 mL / OUT: 2735 mL / NET: -1905 mL    04 Jun 2024 07:01  -  04 Jun 2024 09:56  --------------------------------------------------------  IN: 0 mL / OUT: 40 mL / NET: -40 mL        Constitutional:Alert, responsive, in no acute distress.     Abdominal: soft and supple non distended    Lympatics: no pretibial pitting edema    Spine:          Dressing: clean/dry/intact         Drains: + present with OUTPUT of 315cc         Sensation: [ ]            [ ] Lower extremeity                   sp         dp         saph       kumar         tibial                                                R          +           +             +           +          +                                               L           +           +             +           +          +                  Motor exam: [  ]            [ ] Lower extremeity                   HF(l2)   KE(l3)    TA(l4)   EHL(l5)  GS(s1)                                                 R        5/5        5/5        5/5       5/5         5/5                                               L         5/5        5/5       5/5       5/5          5/5                                                         Vascular:  warm well perfused; capillary refill <3 seconds                            RADIOLOGY & ADDITIONAL STUDIES:      A/P :  68y Male S/P  lumbar lami L3-L5, partial L2 POD #1  -    Pain control  -    DVT ppx: SCDs [ ] Pharmacolgic: Lovenox x 28 days   -    Monitor Drain Output     -    Resume home meds  -    Physical Therapy  - Pain management consulted   -    Weight bearing status: WBAT  -    Dispo planning

## 2024-06-04 NOTE — PROGRESS NOTE ADULT - SUBJECTIVE AND OBJECTIVE BOX
67y/o Male with hx of Anxiety/depression, Hemochromatosis, Bladder and prostate ca getting Keytruda infusions, he has been having worsening low back pain x 1 day.   Pt says he follows with Dr Floyd and was scheduled for lumbar lami on June 12th.  Patient admitted with worsening pain and radiculopathy with lumbar stenosis.    6/4/24: S/P lumbar lami yesterday Sitting in bed reading- reports some pain taking medication with some relief     ICU Vital Signs Last 24 Hrs  T(C): 36.7 (04 Jun 2024 08:31), Max: 36.9 (03 Jun 2024 12:14)  T(F): 98 (04 Jun 2024 08:31), Max: 98.5 (03 Jun 2024 12:14)  HR: 66 (04 Jun 2024 08:31) (66 - 89)  BP: 128/76 (04 Jun 2024 08:31) (114/70 - 157/75)  BP(mean): 87 (03 Jun 2024 22:35) (78 - 101)  ABP: --  ABP(mean): --  RR: 18 (04 Jun 2024 08:31) (14 - 18)  SpO2: 93% (04 Jun 2024 08:31) (93% - 100%)    O2 Parameters below as of 04 Jun 2024 08:31  Patient On (Oxygen Delivery Method): room air      MEDICATIONS  (STANDING):  acetaminophen     Tablet .. 975 milliGRAM(s) Oral every 8 hours  buPROPion XL (24-Hour) . 300 milliGRAM(s) Oral daily  chlorhexidine 2% Cloths 1 Application(s) Topical daily  diazepam    Tablet 5 milliGRAM(s) Oral two times a day  sertraline 100 milliGRAM(s) Oral daily  tamsulosin 0.4 milliGRAM(s) Oral at bedtime    GENERAL: Middle age male looking comfortable  NECK: soft, Supple    CHEST/LUNG: Clear to auscultate bilaterally   HEART: S1S2+, Regular rate and rhythm; No murmurs  ABDOMEN: Soft,   EXTREMITIES: no edema  SKIN: No rashes or lesions  NEURO: AAOX3  PSYCH: normal mood

## 2024-06-05 LAB
ANION GAP SERPL CALC-SCNC: 11 MMOL/L — SIGNIFICANT CHANGE UP (ref 5–17)
BASOPHILS # BLD AUTO: 0.08 K/UL — SIGNIFICANT CHANGE UP (ref 0–0.2)
BASOPHILS NFR BLD AUTO: 0.9 % — SIGNIFICANT CHANGE UP (ref 0–2)
BUN SERPL-MCNC: 15.1 MG/DL — SIGNIFICANT CHANGE UP (ref 8–20)
CALCIUM SERPL-MCNC: 8.7 MG/DL — SIGNIFICANT CHANGE UP (ref 8.4–10.5)
CHLORIDE SERPL-SCNC: 100 MMOL/L — SIGNIFICANT CHANGE UP (ref 96–108)
CO2 SERPL-SCNC: 29 MMOL/L — SIGNIFICANT CHANGE UP (ref 22–29)
CREAT SERPL-MCNC: 0.77 MG/DL — SIGNIFICANT CHANGE UP (ref 0.5–1.3)
EGFR: 98 ML/MIN/1.73M2 — SIGNIFICANT CHANGE UP
EOSINOPHIL # BLD AUTO: 0.32 K/UL — SIGNIFICANT CHANGE UP (ref 0–0.5)
EOSINOPHIL NFR BLD AUTO: 3.6 % — SIGNIFICANT CHANGE UP (ref 0–6)
GLUCOSE BLDC GLUCOMTR-MCNC: 104 MG/DL — HIGH (ref 70–99)
GLUCOSE BLDC GLUCOMTR-MCNC: 110 MG/DL — HIGH (ref 70–99)
GLUCOSE BLDC GLUCOMTR-MCNC: 115 MG/DL — HIGH (ref 70–99)
GLUCOSE BLDC GLUCOMTR-MCNC: 151 MG/DL — HIGH (ref 70–99)
GLUCOSE SERPL-MCNC: 119 MG/DL — HIGH (ref 70–99)
HCT VFR BLD CALC: 41.3 % — SIGNIFICANT CHANGE UP (ref 39–50)
HGB BLD-MCNC: 13.7 G/DL — SIGNIFICANT CHANGE UP (ref 13–17)
IMM GRANULOCYTES NFR BLD AUTO: 0.7 % — SIGNIFICANT CHANGE UP (ref 0–0.9)
LYMPHOCYTES # BLD AUTO: 1.26 K/UL — SIGNIFICANT CHANGE UP (ref 1–3.3)
LYMPHOCYTES # BLD AUTO: 14.3 % — SIGNIFICANT CHANGE UP (ref 13–44)
MCHC RBC-ENTMCNC: 29.8 PG — SIGNIFICANT CHANGE UP (ref 27–34)
MCHC RBC-ENTMCNC: 33.2 GM/DL — SIGNIFICANT CHANGE UP (ref 32–36)
MCV RBC AUTO: 90 FL — SIGNIFICANT CHANGE UP (ref 80–100)
MONOCYTES # BLD AUTO: 0.85 K/UL — SIGNIFICANT CHANGE UP (ref 0–0.9)
MONOCYTES NFR BLD AUTO: 9.6 % — SIGNIFICANT CHANGE UP (ref 2–14)
NEUTROPHILS # BLD AUTO: 6.27 K/UL — SIGNIFICANT CHANGE UP (ref 1.8–7.4)
NEUTROPHILS NFR BLD AUTO: 70.9 % — SIGNIFICANT CHANGE UP (ref 43–77)
PLATELET # BLD AUTO: 194 K/UL — SIGNIFICANT CHANGE UP (ref 150–400)
POTASSIUM SERPL-MCNC: 4.1 MMOL/L — SIGNIFICANT CHANGE UP (ref 3.5–5.3)
POTASSIUM SERPL-SCNC: 4.1 MMOL/L — SIGNIFICANT CHANGE UP (ref 3.5–5.3)
RBC # BLD: 4.59 M/UL — SIGNIFICANT CHANGE UP (ref 4.2–5.8)
RBC # FLD: 11.9 % — SIGNIFICANT CHANGE UP (ref 10.3–14.5)
SODIUM SERPL-SCNC: 140 MMOL/L — SIGNIFICANT CHANGE UP (ref 135–145)
WBC # BLD: 8.84 K/UL — SIGNIFICANT CHANGE UP (ref 3.8–10.5)
WBC # FLD AUTO: 8.84 K/UL — SIGNIFICANT CHANGE UP (ref 3.8–10.5)

## 2024-06-05 RX ADMIN — METHOCARBAMOL 750 MILLIGRAM(S): 500 TABLET, FILM COATED ORAL at 21:40

## 2024-06-05 RX ADMIN — CELECOXIB 200 MILLIGRAM(S): 200 CAPSULE ORAL at 05:49

## 2024-06-05 RX ADMIN — OXYCODONE HYDROCHLORIDE 10 MILLIGRAM(S): 5 TABLET ORAL at 22:39

## 2024-06-05 RX ADMIN — CELECOXIB 200 MILLIGRAM(S): 200 CAPSULE ORAL at 06:49

## 2024-06-05 RX ADMIN — OXYCODONE HYDROCHLORIDE 10 MILLIGRAM(S): 5 TABLET ORAL at 14:21

## 2024-06-05 RX ADMIN — METHOCARBAMOL 750 MILLIGRAM(S): 500 TABLET, FILM COATED ORAL at 05:48

## 2024-06-05 RX ADMIN — Medication 5 MILLIGRAM(S): at 17:05

## 2024-06-05 RX ADMIN — CELECOXIB 200 MILLIGRAM(S): 200 CAPSULE ORAL at 17:04

## 2024-06-05 RX ADMIN — Medication 975 MILLIGRAM(S): at 13:47

## 2024-06-05 RX ADMIN — OXYCODONE HYDROCHLORIDE 10 MILLIGRAM(S): 5 TABLET ORAL at 15:20

## 2024-06-05 RX ADMIN — Medication 975 MILLIGRAM(S): at 21:39

## 2024-06-05 RX ADMIN — Medication 975 MILLIGRAM(S): at 06:48

## 2024-06-05 RX ADMIN — METHOCARBAMOL 750 MILLIGRAM(S): 500 TABLET, FILM COATED ORAL at 13:47

## 2024-06-05 RX ADMIN — Medication 5 MILLIGRAM(S): at 05:48

## 2024-06-05 RX ADMIN — GABAPENTIN 300 MILLIGRAM(S): 400 CAPSULE ORAL at 05:48

## 2024-06-05 RX ADMIN — Medication 975 MILLIGRAM(S): at 05:48

## 2024-06-05 RX ADMIN — Medication 975 MILLIGRAM(S): at 22:39

## 2024-06-05 RX ADMIN — TAMSULOSIN HYDROCHLORIDE 0.4 MILLIGRAM(S): 0.4 CAPSULE ORAL at 21:40

## 2024-06-05 RX ADMIN — ENOXAPARIN SODIUM 40 MILLIGRAM(S): 100 INJECTION SUBCUTANEOUS at 05:50

## 2024-06-05 RX ADMIN — Medication 975 MILLIGRAM(S): at 14:19

## 2024-06-05 RX ADMIN — HYDROMORPHONE HYDROCHLORIDE 4 MILLIGRAM(S): 2 INJECTION INTRAMUSCULAR; INTRAVENOUS; SUBCUTANEOUS at 11:07

## 2024-06-05 RX ADMIN — HYDROMORPHONE HYDROCHLORIDE 4 MILLIGRAM(S): 2 INJECTION INTRAMUSCULAR; INTRAVENOUS; SUBCUTANEOUS at 10:07

## 2024-06-05 RX ADMIN — CELECOXIB 200 MILLIGRAM(S): 200 CAPSULE ORAL at 17:52

## 2024-06-05 RX ADMIN — GABAPENTIN 300 MILLIGRAM(S): 400 CAPSULE ORAL at 17:05

## 2024-06-05 RX ADMIN — SERTRALINE 100 MILLIGRAM(S): 25 TABLET, FILM COATED ORAL at 11:29

## 2024-06-05 RX ADMIN — BUPROPION HYDROCHLORIDE 300 MILLIGRAM(S): 150 TABLET, EXTENDED RELEASE ORAL at 11:29

## 2024-06-05 RX ADMIN — PANTOPRAZOLE SODIUM 40 MILLIGRAM(S): 20 TABLET, DELAYED RELEASE ORAL at 05:49

## 2024-06-05 RX ADMIN — OXYCODONE HYDROCHLORIDE 10 MILLIGRAM(S): 5 TABLET ORAL at 21:39

## 2024-06-05 NOTE — PROGRESS NOTE ADULT - SUBJECTIVE AND OBJECTIVE BOX
[id number]  TAMMIE HART    Patient is a 68y Male s/p lumbar lami L3-L5, partial L2, POD#2. Patient seen and evaluated at bedside. Patient is doing well. Patient is ambulating with PT. Pain as expected to lower back, controlled with pain medication. Denies any acute motor or sensory changes. Denies any fever/chills, SOB, CP, N/V, numbness/tingling. No other orthopedic complaints.     T(C): 36.4 (06-05-24 @ 04:51), Max: 36.9 (06-04-24 @ 16:13)  HR: 69 (06-05-24 @ 04:51) (62 - 69)  BP: 146/74 (06-05-24 @ 04:51) (116/68 - 146/74)  RR: 18 (06-05-24 @ 04:51) (17 - 18)  SpO2: 94% (06-05-24 @ 04:51) (93% - 97%)                          13.7   8.84  )-----------( 194      ( 05 Jun 2024 06:03 )             41.3       Physical Exam:    General: Awake, alert, in NAD  Spine: Dressing with small serosang staining at drain site. drain present, intact (Output 65 overnight, 235 x 24hrs)  Motor exam: [ x ]          Lower extremity                               HF         KE          TA       EHL         GS                                                 R        5/5        5/5        5/5       5/5         5/5                                               L         5/5        5/5       5/5       5/5          5/5    SILT  L2-S1 intact B/L, DP pulses 2+ B/L  Calf soft, NT B/L    06-04-24 @ 07:01  -  06-05-24 @ 07:00  --------------------------------------------------------  IN: 0 mL / OUT: 235 mL / NET: -235 mL        A/P: Patient is a 68y Male s/p lumbar lami L3-L5, partial L2, POD#2    * Pain control. PM following.  * DVTP: SCDs,, Lovenox 40 QD x 28 days    * Monitor Drain Output, can discontinue drain when output equal or less than 10 cc/hr/12 hr.  change dressing when drain pulled  * PT  * WBAT  * Dispo: Home when cleared by PT and medicine

## 2024-06-05 NOTE — PROGRESS NOTE ADULT - SUBJECTIVE AND OBJECTIVE BOX
Called by RN for dressing rolling up and mild staining near drain site. Patient seen and examined. Dressing removed. Incision intact, no active drainage, no erythema. Drain intact, patent, bloody fluid in bulb. New dressing placed. Patient tolerated well. Monitor drain output.

## 2024-06-05 NOTE — PROGRESS NOTE ADULT - SUBJECTIVE AND OBJECTIVE BOX
69y/o Male with hx of Anxiety/depression, Hemochromatosis, Bladder and prostate ca getting Keytruda infusions, he has been having worsening low back pain x 1 day.   Pt says he follows with Dr Floyd and was scheduled for lumbar lami on June 12th.  Patient admitted with worsening pain and radiculopathy with lumbar stenosis.    6/5/24: S/P lumbar lami states he overdid it yesterday and is sore Ambulating independently  KRUNAL drain with sersang drainage ON mild bloody saturation around surgical drain site.    Vital Signs Last 24 Hrs  T(C): 36.5 (05 Jun 2024 09:13), Max: 36.9 (04 Jun 2024 16:13)  T(F): 97.7 (05 Jun 2024 09:13), Max: 98.5 (04 Jun 2024 16:13)  HR: 63 (05 Jun 2024 09:13) (62 - 69)  BP: 144/80 (05 Jun 2024 09:13) (116/68 - 146/74)  BP(mean): --  RR: 18 (05 Jun 2024 09:13) (17 - 18)  SpO2: 99% (05 Jun 2024 09:13) (94% - 99%)    Parameters below as of 05 Jun 2024 09:13  Patient On (Oxygen Delivery Method): room air      MEDICATIONS  (STANDING):  acetaminophen     Tablet .. 975 milliGRAM(s) Oral every 8 hours  buPROPion XL (24-Hour) . 300 milliGRAM(s) Oral daily  chlorhexidine 2% Cloths 1 Application(s) Topical daily  diazepam    Tablet 5 milliGRAM(s) Oral two times a day  sertraline 100 milliGRAM(s) Oral daily  tamsulosin 0.4 milliGRAM(s) Oral at bedtime    GENERAL: Middle age male looking comfortable  NECK: soft, Supple    CHEST/LUNG: Clear to auscultate bilaterally   HEART: S1S2+, Regular rate and rhythm; No murmurs  ABDOMEN: Soft,   EXTREMITIES: no edema  SKIN: No rashes or lesions  NEURO: AAOX3  PSYCH: normal mood          CBC                 13.7   8.84  )-----------( 194      ( 05 Jun 2024 06:03 )             41.3     CHEM  06-05    140  |  100  |  15.1  ----------------------------<  119<H>  4.1   |  29.0  |  0.77    Ca    8.7      05 Jun 2024 06:03

## 2024-06-05 NOTE — DIETITIAN INITIAL EVALUATION ADULT - ORAL INTAKE PTA/DIET HISTORY
Attempted to interview, pt sleeping soundly. Consult received for assessment/education. Pt was previously consulted for education, seen by RD on 6/2. Reported good po intake and no weight changes at that time. RD previously provided verbal diet education, pt declined written literature. RD to follow up as feasible.

## 2024-06-05 NOTE — DIETITIAN INITIAL EVALUATION ADULT - PERTINENT MEDS FT
MEDICATIONS  (STANDING):  celecoxib 200 milliGRAM(s) Oral every 12 hours  dextrose 10% Bolus 125 milliLiter(s) IV Bolus once  dextrose 5%. 1000 milliLiter(s) (100 mL/Hr) IV Continuous <Continuous>  dextrose 5%. 1000 milliLiter(s) (50 mL/Hr) IV Continuous <Continuous>  gabapentin 300 milliGRAM(s) Oral two times a day  insulin lispro (ADMELOG) corrective regimen sliding scale   SubCutaneous three times a day before meals  lactated ringers. 1000 milliLiter(s) (75 mL/Hr) IV Continuous <Continuous>  pantoprazole    Tablet 40 milliGRAM(s) Oral before breakfast  senna 2 Tablet(s) Oral at bedtime  sertraline 100 milliGRAM(s) Oral daily    MEDICATIONS  (PRN):  aluminum hydroxide/magnesium hydroxide/simethicone Suspension 30 milliLiter(s) Oral every 12 hours PRN Indigestion  magnesium hydroxide Suspension 30 milliLiter(s) Oral every 12 hours PRN Constipation

## 2024-06-05 NOTE — DIETITIAN INITIAL EVALUATION ADULT - ADD RECOMMEND
Rx: MVI and vitamin C 500mg daily. Encourage po intake, monitor diet tolerance, and provide assistance at meals as needed. Obtain daily weights to monitor trends.  Rx: MVI daily. Encourage po intake, monitor diet tolerance, and provide assistance at meals as needed. Obtain daily weights to monitor trends.

## 2024-06-05 NOTE — DIETITIAN INITIAL EVALUATION ADULT - PERTINENT LABORATORY DATA
06-05    140  |  100  |  15.1  ----------------------------<  119<H>  4.1   |  29.0  |  0.77    Ca    8.7      05 Jun 2024 06:03    POCT Blood Glucose.: 110 mg/dL (06-05-24 @ 07:39)  A1C with Estimated Average Glucose Result: 6.0 % (06-03-24 @ 01:01)

## 2024-06-05 NOTE — DIETITIAN INITIAL EVALUATION ADULT - OTHER INFO
67y/o Male with hx of Anxiety/depression, Hemochromatosis, Bladder and prostate ca getting Keytruda infusions, he has been having worsening low back pain x 1 day.   Pt says he follows with Dr Floyd and was scheduled for lumbar lami on June 12th.  Patient admitted with worsening pain and radiculopathy with lumbar stenosis.

## 2024-06-06 ENCOUNTER — TRANSCRIPTION ENCOUNTER (OUTPATIENT)
Age: 69
End: 2024-06-06

## 2024-06-06 VITALS
TEMPERATURE: 99 F | DIASTOLIC BLOOD PRESSURE: 87 MMHG | SYSTOLIC BLOOD PRESSURE: 125 MMHG | OXYGEN SATURATION: 96 % | HEART RATE: 81 BPM | RESPIRATION RATE: 18 BRPM

## 2024-06-06 LAB
ANION GAP SERPL CALC-SCNC: 12 MMOL/L — SIGNIFICANT CHANGE UP (ref 5–17)
BASOPHILS # BLD AUTO: 0.06 K/UL — SIGNIFICANT CHANGE UP (ref 0–0.2)
BASOPHILS NFR BLD AUTO: 0.7 % — SIGNIFICANT CHANGE UP (ref 0–2)
BUN SERPL-MCNC: 13.4 MG/DL — SIGNIFICANT CHANGE UP (ref 8–20)
CALCIUM SERPL-MCNC: 8.7 MG/DL — SIGNIFICANT CHANGE UP (ref 8.4–10.5)
CHLORIDE SERPL-SCNC: 98 MMOL/L — SIGNIFICANT CHANGE UP (ref 96–108)
CO2 SERPL-SCNC: 27 MMOL/L — SIGNIFICANT CHANGE UP (ref 22–29)
CREAT SERPL-MCNC: 0.81 MG/DL — SIGNIFICANT CHANGE UP (ref 0.5–1.3)
EGFR: 97 ML/MIN/1.73M2 — SIGNIFICANT CHANGE UP
EOSINOPHIL # BLD AUTO: 0.35 K/UL — SIGNIFICANT CHANGE UP (ref 0–0.5)
EOSINOPHIL NFR BLD AUTO: 4 % — SIGNIFICANT CHANGE UP (ref 0–6)
GLUCOSE BLDC GLUCOMTR-MCNC: 132 MG/DL — HIGH (ref 70–99)
GLUCOSE SERPL-MCNC: 121 MG/DL — HIGH (ref 70–99)
HCT VFR BLD CALC: 42.4 % — SIGNIFICANT CHANGE UP (ref 39–50)
HGB BLD-MCNC: 14.3 G/DL — SIGNIFICANT CHANGE UP (ref 13–17)
IMM GRANULOCYTES NFR BLD AUTO: 0.6 % — SIGNIFICANT CHANGE UP (ref 0–0.9)
LYMPHOCYTES # BLD AUTO: 1.23 K/UL — SIGNIFICANT CHANGE UP (ref 1–3.3)
LYMPHOCYTES # BLD AUTO: 14.1 % — SIGNIFICANT CHANGE UP (ref 13–44)
MCHC RBC-ENTMCNC: 30.2 PG — SIGNIFICANT CHANGE UP (ref 27–34)
MCHC RBC-ENTMCNC: 33.7 GM/DL — SIGNIFICANT CHANGE UP (ref 32–36)
MCV RBC AUTO: 89.6 FL — SIGNIFICANT CHANGE UP (ref 80–100)
MONOCYTES # BLD AUTO: 0.86 K/UL — SIGNIFICANT CHANGE UP (ref 0–0.9)
MONOCYTES NFR BLD AUTO: 9.8 % — SIGNIFICANT CHANGE UP (ref 2–14)
NEUTROPHILS # BLD AUTO: 6.2 K/UL — SIGNIFICANT CHANGE UP (ref 1.8–7.4)
NEUTROPHILS NFR BLD AUTO: 70.8 % — SIGNIFICANT CHANGE UP (ref 43–77)
PLATELET # BLD AUTO: 220 K/UL — SIGNIFICANT CHANGE UP (ref 150–400)
POTASSIUM SERPL-MCNC: 3.9 MMOL/L — SIGNIFICANT CHANGE UP (ref 3.5–5.3)
POTASSIUM SERPL-SCNC: 3.9 MMOL/L — SIGNIFICANT CHANGE UP (ref 3.5–5.3)
RBC # BLD: 4.73 M/UL — SIGNIFICANT CHANGE UP (ref 4.2–5.8)
RBC # FLD: 11.9 % — SIGNIFICANT CHANGE UP (ref 10.3–14.5)
SODIUM SERPL-SCNC: 137 MMOL/L — SIGNIFICANT CHANGE UP (ref 135–145)
WBC # BLD: 8.75 K/UL — SIGNIFICANT CHANGE UP (ref 3.8–10.5)
WBC # FLD AUTO: 8.75 K/UL — SIGNIFICANT CHANGE UP (ref 3.8–10.5)

## 2024-06-06 PROCEDURE — 96374 THER/PROPH/DIAG INJ IV PUSH: CPT

## 2024-06-06 PROCEDURE — 86880 COOMBS TEST DIRECT: CPT

## 2024-06-06 PROCEDURE — 86850 RBC ANTIBODY SCREEN: CPT

## 2024-06-06 PROCEDURE — 85730 THROMBOPLASTIN TIME PARTIAL: CPT

## 2024-06-06 PROCEDURE — 86870 RBC ANTIBODY IDENTIFICATION: CPT

## 2024-06-06 PROCEDURE — 82962 GLUCOSE BLOOD TEST: CPT

## 2024-06-06 PROCEDURE — 93005 ELECTROCARDIOGRAM TRACING: CPT

## 2024-06-06 PROCEDURE — 80053 COMPREHEN METABOLIC PANEL: CPT

## 2024-06-06 PROCEDURE — 99285 EMERGENCY DEPT VISIT HI MDM: CPT | Mod: 25

## 2024-06-06 PROCEDURE — 83036 HEMOGLOBIN GLYCOSYLATED A1C: CPT

## 2024-06-06 PROCEDURE — 87640 STAPH A DNA AMP PROBE: CPT

## 2024-06-06 PROCEDURE — C1889: CPT

## 2024-06-06 PROCEDURE — 80048 BASIC METABOLIC PNL TOTAL CA: CPT

## 2024-06-06 PROCEDURE — 36415 COLL VENOUS BLD VENIPUNCTURE: CPT

## 2024-06-06 PROCEDURE — 87641 MR-STAPH DNA AMP PROBE: CPT

## 2024-06-06 PROCEDURE — 85610 PROTHROMBIN TIME: CPT

## 2024-06-06 PROCEDURE — 85025 COMPLETE CBC W/AUTO DIFF WBC: CPT

## 2024-06-06 PROCEDURE — C9399: CPT

## 2024-06-06 PROCEDURE — 86901 BLOOD TYPING SEROLOGIC RH(D): CPT

## 2024-06-06 PROCEDURE — 86900 BLOOD TYPING SEROLOGIC ABO: CPT

## 2024-06-06 PROCEDURE — 81001 URINALYSIS AUTO W/SCOPE: CPT

## 2024-06-06 PROCEDURE — 93306 TTE W/DOPPLER COMPLETE: CPT

## 2024-06-06 RX ORDER — APIXABAN 2.5 MG/1
1 TABLET, FILM COATED ORAL
Qty: 56 | Refills: 0
Start: 2024-06-06 | End: 2024-07-03

## 2024-06-06 RX ORDER — OXYCODONE HYDROCHLORIDE 5 MG/1
1 TABLET ORAL
Qty: 28 | Refills: 0
Start: 2024-06-06 | End: 2024-06-12

## 2024-06-06 RX ORDER — SERTRALINE 25 MG/1
1 TABLET, FILM COATED ORAL
Qty: 0 | Refills: 0 | DISCHARGE
Start: 2024-06-06

## 2024-06-06 RX ORDER — BUPROPION HYDROCHLORIDE 150 MG/1
1 TABLET, EXTENDED RELEASE ORAL
Qty: 0 | Refills: 0 | DISCHARGE
Start: 2024-06-06

## 2024-06-06 RX ORDER — ENOXAPARIN SODIUM 100 MG/ML
40 INJECTION SUBCUTANEOUS
Qty: 28 | Refills: 0
Start: 2024-06-06 | End: 2024-07-03

## 2024-06-06 RX ORDER — ASPIRIN/CALCIUM CARB/MAGNESIUM 324 MG
1 TABLET ORAL
Qty: 3 | Refills: 0
Start: 2024-06-06 | End: 2024-06-08

## 2024-06-06 RX ORDER — CELECOXIB 200 MG/1
1 CAPSULE ORAL
Qty: 42 | Refills: 0
Start: 2024-06-06 | End: 2024-06-26

## 2024-06-06 RX ORDER — SENNA PLUS 8.6 MG/1
2 TABLET ORAL
Qty: 14 | Refills: 0
Start: 2024-06-06 | End: 2024-06-12

## 2024-06-06 RX ADMIN — SERTRALINE 100 MILLIGRAM(S): 25 TABLET, FILM COATED ORAL at 11:33

## 2024-06-06 RX ADMIN — CELECOXIB 200 MILLIGRAM(S): 200 CAPSULE ORAL at 07:31

## 2024-06-06 RX ADMIN — GABAPENTIN 300 MILLIGRAM(S): 400 CAPSULE ORAL at 06:30

## 2024-06-06 RX ADMIN — BUPROPION HYDROCHLORIDE 300 MILLIGRAM(S): 150 TABLET, EXTENDED RELEASE ORAL at 11:33

## 2024-06-06 RX ADMIN — CELECOXIB 200 MILLIGRAM(S): 200 CAPSULE ORAL at 06:31

## 2024-06-06 RX ADMIN — Medication 975 MILLIGRAM(S): at 07:30

## 2024-06-06 RX ADMIN — ENOXAPARIN SODIUM 40 MILLIGRAM(S): 100 INJECTION SUBCUTANEOUS at 06:31

## 2024-06-06 RX ADMIN — METHOCARBAMOL 750 MILLIGRAM(S): 500 TABLET, FILM COATED ORAL at 06:30

## 2024-06-06 RX ADMIN — Medication 5 MILLIGRAM(S): at 06:30

## 2024-06-06 RX ADMIN — PANTOPRAZOLE SODIUM 40 MILLIGRAM(S): 20 TABLET, DELAYED RELEASE ORAL at 06:31

## 2024-06-06 RX ADMIN — Medication 975 MILLIGRAM(S): at 06:30

## 2024-06-06 NOTE — PROGRESS NOTE ADULT - SUBJECTIVE AND OBJECTIVE BOX
69y/o Male with hx of Anxiety/depression, Hemochromatosis, Bladder and prostate ca getting Keytruda infusions, he has been having worsening low back pain x 1 day.   Pt says he follows with Dr Floyd and was scheduled for lumbar lami on June 12th.  Patient admitted with worsening pain and radiculopathy with lumbar stenosis.    6/6/24: S/P lumbar lami drain removed anticipating discharge     Vital Signs Last 24 Hrs  T(C): 37.1 (06 Jun 2024 08:59), Max: 37.2 (05 Jun 2024 17:17)  T(F): 98.8 (06 Jun 2024 08:59), Max: 99 (05 Jun 2024 17:17)  HR: 81 (06 Jun 2024 08:59) (70 - 90)  BP: 125/87 (06 Jun 2024 08:59) (125/87 - 147/81)  BP(mean): --  RR: 18 (06 Jun 2024 08:59) (18 - 18)  SpO2: 96% (06 Jun 2024 08:59) (95% - 98%)    Parameters below as of 06 Jun 2024 08:59  Patient On (Oxygen Delivery Method): room air    MEDICATIONS  (STANDING):  acetaminophen     Tablet .. 975 milliGRAM(s) Oral every 8 hours  buPROPion XL (24-Hour) . 300 milliGRAM(s) Oral daily  chlorhexidine 2% Cloths 1 Application(s) Topical daily  diazepam    Tablet 5 milliGRAM(s) Oral two times a day  sertraline 100 milliGRAM(s) Oral daily  tamsulosin 0.4 milliGRAM(s) Oral at bedtime    GENERAL: Middle age male looking comfortable  NECK: soft, Supple    CHEST/LUNG: Clear to auscultate bilaterally   HEART: S1S2+, Regular rate and rhythm; No murmurs  ABDOMEN: Soft,   EXTREMITIES: no edema  SKIN: No rashes or lesions  NEURO: AAOX3  PSYCH: normal mood          CBC                              14.3   8.75  )-----------( 220      ( 06 Jun 2024 05:29 )             42.4                13.7   8.84  )-----------( 194      ( 05 Jun 2024 06:03 )             41.3     CHEM  06-06    137  |  98  |  13.4  ----------------------------<  121<H>  3.9   |  27.0  |  0.81    Ca    8.7      06 Jun 2024 05:29      06-05    140  |  100  |  15.1  ----------------------------<  119<H>  4.1   |  29.0  |  0.77    Ca    8.7      05 Jun 2024 06:03

## 2024-06-06 NOTE — PROGRESS NOTE ADULT - ASSESSMENT
67y/o Male with hx of Anxiety/depression, Hemochromatosis, Bladder and prostate ca getting Keytruda infusions, he has been having worsening low back pain x 1 day. Pt says he follows with Dr Floyd and was scheduled for lumbar lami on June 12th. He has long history of low back pain and has been treating conservatively and postponed surgery to take care of his sick wife. He works as a  and continued to have pain in his back radiating down right leg. He ambulates without assistance but admits it has gotten more difficult. He is being treated for prostate and bladder cancer and often notices increased pain after his infusion treatment. He had an Keytruda infusion yesterday and the pain worsened in his lower back on the right side. He tried NSAIDs and a steroid as well as ice packs. He attempted to go to work today but the pain was too severe and he came to the ED. He is doing much better with pain meds now but still c/o low back pain and right leg pain. No numbness or tingling in extremities. He says he has mild weakness in RLE that is unchanged. No bladder or bowel incontinence. No other complaints, has no fever, chills, chest pain, nausea, vomiting, medicine consulted for medical optimization.     Plan:     Lower back pain s/p Lumbar laminectomy post op day 0:   - abx per ortho   - opiate induced constipation regimen   - encouraging incentive spirometry   -DVT prophylaxis and Pain meds as per Ortho team   -PT/OT and weight bearing per ortho   -Wound care  -drain care    Hx of Depression and Anxiety: will resume home dose of Diazepam and zoloft.     Hx of Hemochromatosis: Will monitor CBC.     Hx of Prostate and bladder Ca: following Oncology and getting Keytruda infusion, seen by Oncology     MARY: CPAP    patient is at high risk for DVT given Hx of prostate Ca, would given lovenox sc for prophylaxis.     once the drain is out the ok from the medicine point of view for discharge pending PT and Ortho eval.     Medicine team is signing off, please call as needed        
67y/o Male with hx of Anxiety/depression, Hemochromatosis, Bladder and prostate ca getting Keytruda infusions, he has been having worsening low back pain x 1 day.   Pt says he follows with Dr Floyd and was scheduled for lumbar lami on June 12th.  Patient admitted with worsening pain and radiculopathy with lumbar stenosis.    Radiculopathy with lumbar stenosis:  -ortho following-S/P  lumbar laminectomy L3, L4, L5 partial L2 on 6/4/24 for lumbar stenosis with neurogenic claudication.  -pain medications as per orthopedics     Hx of prostate and bladder cancer:  -patient with past medical history of non muscle invasive bladder cancer, large, multifocal T1 + CIS, on Keytruda.  To keep appointment with [facility] upon discharge.  On pembrolizumab as per protocol .  Plan is for total 9 cycles.  Latest dose of Keytruda on 5/30/24  -has history of [place] 3 + 4 = 7 prostate adenocarcinoma, rT3a (probable) N0 M0), iPSA 6.5 ng/ml, +IDC.  Being monitored for now, eventually for definitive management after 6 month cystoscopy (PSA being monitored with each dose of pembrolizumab; PSA on 2/27/24 was 2.07)  -No acute intervention from oncology standpoint during inpatient stay    Will follow
67y/o Male with hx of Anxiety/depression, Hemochromatosis, Bladder and prostate ca getting Keytruda infusions, he has been having worsening low back pain x 1 day.   Pt says he follows with Dr Floyd and was scheduled for lumbar lami on June 12th.  Patient admitted with worsening pain and radiculopathy with lumbar stenosis.    Radiculopathy with lumbar stenosis:  -ortho following-S/P  lumbar laminectomy L3, L4, L5 partial L2 on 6/4/24 for lumbar stenosis with neurogenic claudication.  -pain medications as per orthopedics   - KRUNAL drain removed    Hx of prostate and bladder cancer:  -patient with past medical history of non muscle invasive bladder cancer, large, multifocal T1 + CIS, on Keytruda.  [place] [facility] [place]  On pembrolizumab as per protocol .  Plan is for total 9 cycles.  Latest dose of Keytruda on 5/30/24  -has history of [place] 3 + 4 = 7 prostate adenocarcinoma, rT3a (probable) N0 M0), iPSA 6.5 ng/ml, +IDC.  Being monitored for now, eventually for definitive management after 6 month cystoscopy (PSA being monitored with each dose of pembrolizumab; PSA on 2/27/24 was 2.07)    Anticipating discharge today   Follow up at [facility] 
69y/o Male with hx of Anxiety/depression, Hemochromatosis, Bladder and prostate ca getting Keytruda infusions, he has been having worsening low back pain x 1 day. Pt says he follows with Dr Floyd and was scheduled for lumbar lami on June 12th. He has long history of low back pain and has been treating conservatively and postponed surgery to take care of his sick wife. He works as a  and continued to have pain in his back radiating down right leg. He ambulates without assistance but admits it has gotten more difficult. He is being treated for prostate and bladder cancer and often notices increased pain after his infusion treatment. He had an Keytruda infusion yesterday and the pain worsened in his lower back on the right side. He tried NSAIDs and a steroid as well as ice packs. He attempted to go to work today but the pain was too severe and he came to the ED. He is doing much better with pain meds now but still c/o low back pain and right leg pain. No numbness or tingling in extremities. He says he has mild weakness in RLE that is unchanged. No bladder or bowel incontinence. No other complaints, has no fever, chills, chest pain, nausea, vomiting, medicine consulted for medical optimization.     Plan:     Lower back pain:     - abx per ortho   - c/w IVF once NPO  - opiate induced constipation regimen   - encouraging incentive spirometry   -DVT prophylaxis and Pain meds as per Ortho team   -PT/OT and weight bearing per ortho   -further imaging like MRI as per Ortho give patient hx of prostate ca, might benefit from MRI       Hx of Depression and Anxiety: will resume home dose of Diazepam and zoloft.     Hx of Hemochromatosis: Will monitor CBC.     Hx of Prostate and bladder Ca: following Oncology and getting Keytruda infusion    MARY: CPAP     Patient denies Hx of exertional dysnea or chest pain, , no personal or family hx of easy bleeding, Patient's METS Score is >4  and RCRI is 0, patient labs reviewed,  EKG showed T inversion in V2 and V3, no previous EKG to compare, TTE done looks ok, patient is at intermediate risk for perioperative cardiovascular complication and is optimized from the medicine point of view for planned procedure.     
67y/o Male with hx of Anxiety/depression, Hemochromatosis, Bladder and prostate ca getting Keytruda infusions, he has been having worsening low back pain x 1 day.   Pt says he follows with Dr Floyd and was scheduled for lumbar lami on June 12th.  Patient admitted with worsening pain and radiculopathy with lumbar stenosis.    Radiculopathy with lumbar stenosis:  -follow-up orthopedics recommendation  -pain medications as per orthopedics   -plan is for OR on Monday with Dr. Floyd    Hx of prostate and bladder cancer:  -patient with past medical history of non muscle invasive bladder cancer, large, multifocal T1 + CIS, on Keytruda.  To keep appointment with [facility] upon discharge.  On pembrolizumab as per protocol .  Plan is for total 9 cycles.  Latest dose of Keytruda on 5/30/24  -has history of [place] 3 + 4 = 7 prostate adenocarcinoma, rT3a (probable) N0 M0), iPSA 6.5 ng/ml, +IDC.  Being monitored for now, eventually for definitive management after 6 month cystoscopy (PSA being monitored with each dose of pembrolizumab; PSA on 2/27/24 was 2.07)  -No acute intervention from oncology standpoint during inpatient stay    Will follow
67y/o Male with hx of Anxiety/depression, Hemochromatosis, Bladder and prostate ca getting Keytruda infusions, he has been having worsening low back pain x 1 day.   Pt says he follows with Dr Floyd and was scheduled for lumbar lami on June 12th.  Patient admitted with worsening pain and radiculopathy with lumbar stenosis.    Radiculopathy with lumbar stenosis:  -ortho following-plan is for OR on Monday with Dr. Floyd  -pain medications as per orthopedics     Hx of prostate and bladder cancer:  -patient with past medical history of non muscle invasive bladder cancer, large, multifocal T1 + CIS, on Keytruda.  To keep appointment with [facility] upon discharge.  On pembrolizumab as per protocol .  Plan is for total 9 cycles.  Latest dose of Keytruda on 5/30/24  -has history of [place] 3 + 4 = 7 prostate adenocarcinoma, rT3a (probable) N0 M0), iPSA 6.5 ng/ml, +IDC.  Being monitored for now, eventually for definitive management after 6 month cystoscopy (PSA being monitored with each dose of pembrolizumab; PSA on 2/27/24 was 2.07)  -No acute intervention from oncology standpoint during inpatient stay    Will follow
67y/o Male with hx of Anxiety/depression, Hemochromatosis, Bladder and prostate ca getting Keytruda infusions, he has been having worsening low back pain x 1 day. Pt says he follows with Dr Floyd and was scheduled for lumbar lami on June 12th. He has long history of low back pain and has been treating conservatively and postponed surgery to take care of his sick wife. He works as a  and continued to have pain in his back radiating down right leg. He ambulates without assistance but admits it has gotten more difficult. He is being treated for prostate and bladder cancer and often notices increased pain after his infusion treatment. He had an Keytruda infusion yesterday and the pain worsened in his lower back on the right side. He tried NSAIDs and a steroid as well as ice packs. He attempted to go to work today but the pain was too severe and he came to the ED. He is doing much better with pain meds now but still c/o low back pain and right leg pain. No numbness or tingling in extremities. He says he has mild weakness in RLE that is unchanged. No bladder or bowel incontinence. No other complaints, has no fever, chills, chest pain, nausea, vomiting, medicine consulted for medical optimization.     Plan:     Lower back pain:   -going for surgery today  - abx per ortho   - c/w IVF once NPO  - opiate induced constipation regimen   - encouraging incentive spirometry   -DVT prophylaxis and Pain meds as per Ortho team   -PT/OT and weight bearing per ortho         Hx of Depression and Anxiety: will resume home dose of Diazepam and zoloft.     Hx of Hemochromatosis: Will monitor CBC.     Hx of Prostate and bladder Ca: following Oncology and getting Keytruda infusion, seen by Oncology     MARY: CPAP     Patient denies Hx of exertional dysnea or chest pain, , no personal or family hx of easy bleeding, Patient's METS Score is >4  and RCRI is 0, patient labs reviewed,  EKG showed T inversion in V2 and V3, no previous EKG to compare, TTE done looks ok, patient is at intermediate risk for perioperative cardiovascular complication and is optimized from the medicine point of view for planned procedure.     
69y/o Male with hx of Anxiety/depression, Hemochromatosis, Bladder and prostate ca getting Keytruda infusions, he has been having worsening low back pain x 1 day.   Pt says he follows with Dr Floyd and was scheduled for lumbar lami on June 12th.  Patient admitted with worsening pain and radiculopathy with lumbar stenosis.    Radiculopathy with lumbar stenosis:  -ortho following-S/P  lumbar laminectomy L3, L4, L5 partial L2 on 6/4/24 for lumbar stenosis with neurogenic claudication.  -pain medications as per orthopedics   - KRUNAL drain with serosanguinous drainage - can discontinue when output equal or less than 10 cc/hr/12 h    Hx of prostate and bladder cancer:  -patient with past medical history of non muscle invasive bladder cancer, large, multifocal T1 + CIS, on Keytruda.  To keep appointment with [facility] upon discharge.  On pembrolizumab as per protocol .  Plan is for total 9 cycles.  Latest dose of Keytruda on 5/30/24  -has history of [place] 3 + 4 = 7 prostate adenocarcinoma, rT3a (probable) N0 M0), iPSA 6.5 ng/ml, +IDC.  Being monitored for now, eventually for definitive management after 6 month cystoscopy (PSA being monitored with each dose of pembrolizumab; PSA on 2/27/24 was 2.07)  -No acute intervention from oncology standpoint during inpatient stay    Will follow and update [facility]  Anticipating discharge tomorrow 
Attending statement:  I have personally seen this patient, and formed a face to face diagnostic evaluation on this patient on this date.  I have reviewed the PA, NP and or Medical/PA student and/or Resident documentation and agree with the history, physical exam and plan of care except if noted otherwise.      Patient seen and examined stable comfortable again underlying spinal stenosis reviewed discussed recommending lumbar laminectomy Monday with appropriate OR timing this will be conducted from L2-L5 partial hemilaminotomy/laminectomy at L2 L5 complete laminectomy 3 and 4.  Risks benefits questions comments concerns indications risks excetra have been reviewed multiple times patient wishes to proceed secondary to her current very poor quality of life urinary retention and neurogenic claudication.
Attending statement:  I have personally seen this patient, and formed a face to face diagnostic evaluation on this patient on this date.  I have reviewed the PA, NP and or Medical/PA student and/or Resident documentation and agree with the history, physical exam and plan of care except if noted otherwise.      Again discussed lumbar laminectomy secondary to underlying spinal stenosis urinary retention neurogenic claudication worsening from outpatient basis stable for surgical management this will proceed tomorrow in the operative suite with a lumbar laminectomy L2-L5.

## 2024-06-06 NOTE — DISCHARGE NOTE NURSING/CASE MANAGEMENT/SOCIAL WORK - PATIENT PORTAL LINK FT
You can access the FollowMyHealth Patient Portal offered by [facility] by registering at the following website: [email or web address]. By joining [facility]’s FollowMyHealth portal, you will also be able to view your health information using other applications (apps) compatible with our system.

## 2024-06-06 NOTE — DISCHARGE NOTE NURSING/CASE MANAGEMENT/SOCIAL WORK - NSDCPEFALRISK_GEN_ALL_CORE
For information on Fall & Injury Prevention, visit: [email or web address] OR  [email or web address] OR  https://www.cdc.gov/steadi/patient.html

## 2024-06-06 NOTE — PROGRESS NOTE ADULT - SUBJECTIVE AND OBJECTIVE BOX
ORTHO-SPINE POST-OP PROGRESS NOTE:      [id number]    TAMMIE HART      PROCEDURE: Lumbar lami L3-5, partial L2    DOS: 6/3      SUBJECTIVE: 68y Patient seen and examined. Patient reports of moderate discomfort that is controlled by pain medications. Patient denies of acute sensory or motor changes.                             14.3   8.75  )-----------( 220      ( 06 Jun 2024 05:29 )             42.4               I&O's Detail    05 Jun 2024 07:01  -  06 Jun 2024 07:00  --------------------------------------------------------  IN:  Total IN: 0 mL    OUT:    Bulb (mL): 130 mL  Total OUT: 130 mL    Total NET: -130 mL            acetaminophen     Tablet .. 975 milliGRAM(s) Oral every 8 hours  aluminum hydroxide/magnesium hydroxide/simethicone Suspension 30 milliLiter(s) Oral every 12 hours PRN  buPROPion XL (24-Hour) . 300 milliGRAM(s) Oral daily  celecoxib 200 milliGRAM(s) Oral every 12 hours  dextrose 10% Bolus 125 milliLiter(s) IV Bolus once  dextrose 5%. 1000 milliLiter(s) IV Continuous <Continuous>  dextrose 5%. 1000 milliLiter(s) IV Continuous <Continuous>  dextrose 50% Injectable 25 Gram(s) IV Push once  dextrose 50% Injectable 12.5 Gram(s) IV Push once  dextrose Oral Gel 15 Gram(s) Oral once PRN  diazepam    Tablet 5 milliGRAM(s) Oral two times a day  enoxaparin Injectable 40 milliGRAM(s) SubCutaneous every 24 hours  gabapentin 300 milliGRAM(s) Oral two times a day  glucagon  Injectable 1 milliGRAM(s) IntraMuscular once  HYDROmorphone   Tablet 4 milliGRAM(s) Oral every 4 hours PRN  insulin lispro (ADMELOG) corrective regimen sliding scale   SubCutaneous three times a day before meals  lactated ringers. 1000 milliLiter(s) IV Continuous <Continuous>  magnesium hydroxide Suspension 30 milliLiter(s) Oral every 12 hours PRN  melatonin 3 milliGRAM(s) Oral at bedtime PRN  methocarbamol 750 milliGRAM(s) Oral every 8 hours  oxyCODONE    IR 5 milliGRAM(s) Oral every 4 hours PRN  oxyCODONE    IR 10 milliGRAM(s) Oral every 4 hours PRN  pantoprazole    Tablet 40 milliGRAM(s) Oral before breakfast  senna 2 Tablet(s) Oral at bedtime  sertraline 100 milliGRAM(s) Oral daily  tamsulosin 0.4 milliGRAM(s) Oral at bedtime        T(C): 36.6 (06-06-24 @ 04:05), Max: 37.2 (06-05-24 @ 17:17)  HR: 90 (06-06-24 @ 04:05) (63 - 90)  BP: 147/81 (06-06-24 @ 04:05) (128/90 - 147/81)  RR: 18 (06-06-24 @ 04:05) (18 - 18)  SpO2: 95% (06-06-24 @ 04:05) (95% - 99%)  Wt(kg): --      PHYSICAL EXAM:     Constitutional: Alert, responsive, in no acute distress.     Spine:          Dressing: Clean/dry/intact WITH DRAIN NOTED. No active bleeding or discharge noted.            Drains:  present with OUTPUT of 75/115           Drain removed and dressing change           Skin: Wound is clean and intact. No active discharge. No wound dehisence.            Sensation: normal to light touch. No focal deficits noted of the lower extremities.                Motor exam: 5/5 hip flexion, knee flexion and ankle plantar and dorsiflexion. No focal weaknesses noted.                                                        Vascular:  + warm well perfused; capillary refill <3 seconds                                                       A/P :  72y Male S/P  Lumbar lami L3-5, partial L2 POD# 3    -  Pain control  -  DVT ppx: [x]SCDs   [x] Pharmacolgic  Lovenox 40 mg   -  PT and out of bed today  -  Weight bearing status: WBAT [X]         -  Dispo: Home vs [facility]  - dressing change  - drain removed

## 2024-06-06 NOTE — PROGRESS NOTE ADULT - REASON FOR ADMISSION
worsening back pain

## 2024-06-06 NOTE — PROGRESS NOTE ADULT - PROVIDER SPECIALTY LIST ADULT
Heme/Onc
Hospitalist
Hospitalist
Orthopedics
Orthopedics
Heme/Onc
Hospitalist
Orthopedics
Orthopedics
Heme/Onc
Orthopedics

## 2024-06-07 ENCOUNTER — NON-APPOINTMENT (OUTPATIENT)
Age: 69
End: 2024-06-07

## 2024-06-07 ENCOUNTER — APPOINTMENT (OUTPATIENT)
Dept: ORTHOPEDIC SURGERY | Facility: CLINIC | Age: 69
End: 2024-06-07
Payer: COMMERCIAL

## 2024-06-07 PROBLEM — C67.9 MALIGNANT NEOPLASM OF BLADDER, UNSPECIFIED: Chronic | Status: ACTIVE | Noted: 2024-05-31

## 2024-06-07 PROBLEM — F41.9 ANXIETY DISORDER, UNSPECIFIED: Chronic | Status: ACTIVE | Noted: 2024-05-31

## 2024-06-07 PROBLEM — C61 MALIGNANT NEOPLASM OF PROSTATE: Chronic | Status: ACTIVE | Noted: 2024-05-31

## 2024-06-07 PROCEDURE — 72100 X-RAY EXAM L-S SPINE 2/3 VWS: CPT

## 2024-06-07 PROCEDURE — 20605 DRAIN/INJ JOINT/BURSA W/O US: CPT | Mod: 79,RT

## 2024-06-07 PROCEDURE — 99203 OFFICE O/P NEW LOW 30 MIN: CPT | Mod: 25

## 2024-06-08 RX ORDER — APIXABAN 2.5 MG/1
1 TABLET, FILM COATED ORAL
Qty: 56 | Refills: 0
Start: 2024-06-08 | End: 2024-07-05

## 2024-06-12 NOTE — HISTORY OF PRESENT ILLNESS
[___ Days Post Op] : post op day #[unfilled] [Xray (Date:___)] : [unfilled] Xray -  [de-identified] : POS: Lumbar laminectomy, L2, L3, L4, L5. DOS: 06/03/2024 [de-identified] : Keagan was discharged from the hospital just yesterday after a lumbar laminectomy surgery.  He states his back pain is very well-controlled.  He has no issue with his left leg.  He is having severe right gluteus pain worse with getting up from sitting but constant.  It was better while he was in the hospital been starting to worsen for the last 24 hours.  No change in bowel or bladder.  No decrease in pain no acute decrease in appetite neurogenic claudication symptoms have resolved since his laminectomy [de-identified] : Constitutional nontoxic in appearance, ambulating without assistive device Skin Dressing is clean dry intact without any tach, bilateral lower extremities clean dry intact.   Vascular: Negative Homans bilaterally  she is skeletal: 5 out of 5 bilateral lower extremities manually.  Positive gluteus tendinopathy type findings reproducible on palpation worse with change position sit to stand.  Well-controlled low back discomfort. Neurologic: Negative straight leg raise.  No decrease in sensation of bilateral lower extremities manually. Verbal consent was obtained and all questions, comments and concerns were addressed.  After trigger point in the affected area into superficial muscular trigger point was cleansed with alcohol prep X 3, ethyl chloride was used as local anesthetic.  Under sterile precautions 1cc of 1 percent lidocaine without epinephrine, plus 10 mg dexamethasone were instilled into the affected right gluteal trigger points.  Patient tolerated procedure well. Dry sterile dressing was placed and patient described relief of pain 5 minutes after procedure was performed.  [de-identified] : X-rays of the lumbar spine have been reviewed that shows no acute spondylolisthesis pression and is status post 3 4 partial to partial 5 lumbar laminectomy.  No acute processes [de-identified] : Status post lumbar laminectomy, right gluteus tendinopathy [de-identified] : Patient did well with gluteus tendon injection on the right.  He will do figure 4 stretches anterior thigh stretches twice a day after a warm shower or heat.  Heat ice topicals as needed.  Physical therapy for decrease pain increase function regarding his right gluteus tendinopathy and will start physical therapy for status post laminectomy 4 weeks postop.  He will follow-up as scheduled for his postop appointment next week.  He will reach out as needed increased pain.

## 2024-06-13 ENCOUNTER — APPOINTMENT (OUTPATIENT)
Dept: ORTHOPEDIC SURGERY | Facility: CLINIC | Age: 69
End: 2024-06-13
Payer: COMMERCIAL

## 2024-06-13 DIAGNOSIS — M48.061 SPINAL STENOSIS, LUMBAR REGION WITHOUT NEUROGENIC CLAUDICATION: ICD-10-CM

## 2024-06-13 DIAGNOSIS — M47.816 SPONDYLOSIS W/OUT MYELOPATHY OR RADICULOPATHY, LUMBAR REGION: ICD-10-CM

## 2024-06-13 DIAGNOSIS — M54.16 RADICULOPATHY, LUMBAR REGION: ICD-10-CM

## 2024-06-13 PROCEDURE — 99024 POSTOP FOLLOW-UP VISIT: CPT

## 2024-06-18 NOTE — HISTORY OF PRESENT ILLNESS
[___ Days Post Op] : post op day #[unfilled] [Doing Well] : is doing well [No Sign of Infection] : is showing no signs of infection [Adequate Pain Control] : has adequate pain control [de-identified] : POS: Lumbar laminectomy, L2, L3, L4, L5. DOS: 06/03/2024 [de-identified] : 68-year-old male present today for follow-up evaluation status post lumbar laminectomy.  Patient's postoperative recovery has been complicated by gluteal tendinopathy.  He underwent a trigger point injection in the last visit and states that this has been minimally helpful.  He has been taking ibuprofen with some minimal relief in symptoms.  Still utilizing narcotic pain medication intermittently for pain above and beyond.  He is starting physical therapy with a therapist specializing in ART treatment.  He states his previously provided phenomenal outcomes were lumbar symptoms and is very hopeful that will help with his gluteal tendinopathy.  Patient is aware that the nature and progression of gluteal tendinopathy is slow recovery, primarily dependent on PT/home exercising.  Overall this very minimal postoperative pain in the lumbar spine is well as no pain in the lower extremities.  No fevers or chills.  No incisional discharge.  Otherwise doing well. [de-identified] : Constitutional: Alert and in no acute distress.  Non-toxic in appearance. Psychiatric: Oriented to person, place, and time. I nsight and judgement were intact and the affect was normal. Cardiovascular: Regular rate assessed through peripheral pulses. Pulmonary: Nonlabored breathing on room air. Lymphatics: No peripheral lymphadenopathy appreciated.  Skin: Surgical incision over the lumbar spine is clean, dry and intact.  Steri-strips in place. There is no erythema, rubor, calor, discharge or dehiscence.  Incision was cleansed with ChloraPrep and a dry sterile dressing was placed. Neurologic: No acute sensory and/or motor deficits. Musculoskeletal: Patient is ambulating well without any assistive device.  No focal motor deficit of the bilateral lower extremities manually.  Mild mechanical lumbar and right gluteal pain, myositis reproducible on palpation as expected.  Additional findings included an unremarkable neurological exam, peripheral vascular exam normal and maneuvers demonstrated a negative July's sign. [de-identified] : No new imaging obtained today.  X-rays from 1 week ago demonstrating patient status post laminectomy without any acute osseous abnormalities. [de-identified] : 68-year-old male 10 days status post lumbar laminectomy doing well. [de-identified] : At this time the patient is doing well status post lumbar laminectomy, currently with gluteal tendinopathy on the right.  Patient is starting physical therapy/ART therapy.  Did advise the patient that the injection still has another week for full effect.  He may continue with topical modalities such as heat, ice, lidocaine patches continue with postoperative medication as previously prescribed.  Advised against any repetitive lifting bending twisting.  No lifting greater than 20 pounds at this time.  Continue with dressing changes prn. No submerging x4 weeks post op. Patient is transitioning into a more supervisory role as an .  He will follow-up in 3 weeks for continued clinical evaluation status post lumbar laminectomy. All questions and concerns were addressed with the patient and they are in agreement with this plan.   This note was generated using dragon medical dictation software. A reasonable effort has been made for proofreading its contents, but typos may still remain. If there are any questions or points of clarification needed please notify my office.   Addendum on June 18, 2024.  Patient calls the office stating he is having pain 10 out of 10 regarding right hip low back.  He denies fever chills night sweats but is having difficulty weightbearing on his right leg due to pain.  He has had multiple courses of anti-inflammatories oxycodone Tylenol muscle relaxants and 2 gluteus injections with cortisone and lidocaine.  He is also doing ART physical therapy 2-3 times a week and doing home exercises since this started approximately 3 weeks ago.  I am concerned for possible epidural collection and lumbar MRI is ordered is medically necessary I am also concerned about severe gluteus tendinopathy, acute hip DJD which may require surgical intervention, we have ordered an MRI of his right hip.  He will follow-up afterward.  Patient is completely failing conservative treatment.

## 2024-06-22 ENCOUNTER — NON-APPOINTMENT (OUTPATIENT)
Age: 69
End: 2024-06-22

## 2024-06-26 ENCOUNTER — APPOINTMENT (OUTPATIENT)
Dept: MRI IMAGING | Facility: CLINIC | Age: 69
End: 2024-06-26
Payer: COMMERCIAL

## 2024-06-26 ENCOUNTER — OUTPATIENT (OUTPATIENT)
Dept: OUTPATIENT SERVICES | Facility: HOSPITAL | Age: 69
LOS: 1 days | End: 2024-06-26
Payer: COMMERCIAL

## 2024-06-26 DIAGNOSIS — Z00.8 ENCOUNTER FOR OTHER GENERAL EXAMINATION: ICD-10-CM

## 2024-06-26 PROCEDURE — 73721 MRI JNT OF LWR EXTRE W/O DYE: CPT

## 2024-06-26 PROCEDURE — 73721 MRI JNT OF LWR EXTRE W/O DYE: CPT | Mod: 26,RT,MH

## 2024-06-26 PROCEDURE — 72148 MRI LUMBAR SPINE W/O DYE: CPT

## 2024-06-26 PROCEDURE — 72148 MRI LUMBAR SPINE W/O DYE: CPT | Mod: 26,MH

## 2024-07-02 ENCOUNTER — APPOINTMENT (OUTPATIENT)
Dept: ORTHOPEDIC SURGERY | Facility: CLINIC | Age: 69
End: 2024-07-02

## 2024-07-02 DIAGNOSIS — M67.951 UNSPECIFIED DISORDER OF SYNOVIUM AND TENDON, RIGHT THIGH: ICD-10-CM

## 2024-07-02 DIAGNOSIS — Z98.890 OTHER SPECIFIED POSTPROCEDURAL STATES: ICD-10-CM

## 2024-07-02 PROCEDURE — 99024 POSTOP FOLLOW-UP VISIT: CPT

## 2024-07-02 PROCEDURE — 72100 X-RAY EXAM L-S SPINE 2/3 VWS: CPT

## 2024-07-02 PROCEDURE — 20552 NJX 1/MLT TRIGGER POINT 1/2: CPT | Mod: 79,RT

## 2024-07-03 ENCOUNTER — NON-APPOINTMENT (OUTPATIENT)
Age: 69
End: 2024-07-03

## 2024-07-17 ENCOUNTER — NON-APPOINTMENT (OUTPATIENT)
Age: 69
End: 2024-07-17

## 2024-07-21 PROBLEM — M25.561 PAIN IN BOTH KNEES, UNSPECIFIED CHRONICITY: Status: ACTIVE | Noted: 2024-07-21

## 2024-07-22 ENCOUNTER — APPOINTMENT (OUTPATIENT)
Dept: ORTHOPEDIC SURGERY | Facility: CLINIC | Age: 69
End: 2024-07-22
Payer: COMMERCIAL

## 2024-07-22 VITALS
HEIGHT: 72 IN | BODY MASS INDEX: 31.83 KG/M2 | WEIGHT: 235 LBS | SYSTOLIC BLOOD PRESSURE: 135 MMHG | DIASTOLIC BLOOD PRESSURE: 92 MMHG | HEART RATE: 90 BPM

## 2024-07-22 DIAGNOSIS — M17.0 BILATERAL PRIMARY OSTEOARTHRITIS OF KNEE: ICD-10-CM

## 2024-07-22 PROCEDURE — 73564 X-RAY EXAM KNEE 4 OR MORE: CPT | Mod: 50

## 2024-07-22 PROCEDURE — 99204 OFFICE O/P NEW MOD 45 MIN: CPT | Mod: 25

## 2024-07-22 PROCEDURE — 20610 DRAIN/INJ JOINT/BURSA W/O US: CPT | Mod: 50

## 2024-07-22 RX ORDER — MELOXICAM 15 MG/1
15 TABLET ORAL
Qty: 30 | Refills: 0 | Status: ACTIVE | COMMUNITY
Start: 2024-07-22 | End: 1900-01-01

## 2024-07-22 NOTE — PHYSICAL EXAM
[de-identified] : GENERAL APPEARANCE: Well nourished and hydrated, pleasant, alert, and oriented x 3. Appears their stated age.  HEENT: Normocephalic, extraocular eye motion intact. Nasal septum midline. Oral cavity clear. External auditory canal clear.  RESPIRATORY: Breath sounds clear and audible in all lobes. No wheezing, No accessory muscle use. CARDIOVASCULAR: No apparent abnormalities. No lower leg edema. No varicosities. Pedal pulses are palpable. NEUROLOGIC: Sensation is normal, no muscle weakness in the upper or lower extremities. DERMATOLOGIC: No apparent skin lesions, moist, warm, no rash. SPINE: Cervical spine appears normal and moves freely; thoracic spine appears normal and moves freely; there is pain with range of motion lumbar spine MUSCULOSKELETAL: Hands, wrists, and elbows are normal and move freely, shoulders are normal and move freely.  Psychiatric: Oriented to person, place, and time, insight and judgement were intact and the affect was normal.  Musculoskeletal:. Left knee exam shows moderate effusion, ROM is 0-1 20 degrees, no instability, no pain with John, medial joint line tenderness.  Right knee exam shows moderate effusion, ROM is 0-1 20 degrees, no instability, no pain with John, medial joint line tenderness.  5/5 motor strength in bilateral lower extremities. Sensory: Intact in bilateral lower extremities. DTRs: Biceps, brachioradialis, triceps, patellar, ankle and plantar 2+ and symmetric bilaterally. Pulses: dorsalis pedis, posterior tibial, femoral, popliteal, and radial 2+ and symmetric bilaterally.   [de-identified] : 4 views of bilateral knees obtained the office today show no acute fracture or dislocation.  There is severe medial joint space narrowing bone bone osteoarthritis tricompartmental degenerative changes consistent, large grade 4 changes bilaterally

## 2024-07-22 NOTE — DISCUSSION/SUMMARY
[Medication Risks Reviewed] : Medication risks reviewed [Surgical risks reviewed] : Surgical risks reviewed [de-identified] : Patient is a 68-year-old male with severe bilateral knee osteoarthritis presenting today for initial evaluation.  Has not yet exhausted conservative treatment I think that is warranted at this time.  Given injection of cortisone in the bilateral knees which she tolerated well.  Discussed low-impact activity excise.  I recommended physical therapy.  I given a prescription meloxicam 15 mg daily as needed for pain.  I will see him back in 6 weeks for repeat evaluation possible viscosupplementation.  All questions were asked and answered.  He was advised may be candidate for total knee arthroplasty in future

## 2024-07-22 NOTE — HISTORY OF PRESENT ILLNESS
[de-identified] : Patient is a 68-year-old male here today for evaluation of bilateral knee pains going for many years.  In 2018 he states he had injections that provided some relief.  However his knees have now become more bothersome after his spine surgery.  He states he has pain with going downstairs arise or seated position.  Often notices swelling in the knees.  Denies any falls or trauma.

## 2024-08-08 ENCOUNTER — APPOINTMENT (OUTPATIENT)
Dept: ORTHOPEDIC SURGERY | Facility: CLINIC | Age: 69
End: 2024-08-08

## 2024-08-08 PROCEDURE — 72100 X-RAY EXAM L-S SPINE 2/3 VWS: CPT

## 2024-08-08 PROCEDURE — 99024 POSTOP FOLLOW-UP VISIT: CPT

## 2024-08-08 NOTE — HISTORY OF PRESENT ILLNESS
[de-identified] : POS: Lumbar laminectomy, L2, L3, L4, L5.  DOS: 06/03/2024 [de-identified] : 68-year-old male presenting today 9 weeks status post lumbar laminectomy.  At this time the patient is doing very well.  Back to all activities and at work without any significant concerns or pain.  Occasionally gets dull ache in the lumbar spine but is well-controlled.  Lower extremity symptoms have essentially resolved.  He continues to do refractive treatment and is responding very well to it.  Overall very happy with outcomes of his surgery. [de-identified] : Constitutional: Alert and in no acute distress.  Non-toxic in appearance. Psychiatric: Oriented to person, place, and time. I nsight and judgement were intact and the affect was normal. Cardiovascular: Regular rate assessed through peripheral pulses. Pulmonary: Nonlabored breathing on room air. Lymphatics: No peripheral lymphadenopathy appreciated.  Skin: Surgical incision over the well-healed of the lumbar spine.  Neurologic: No acute sensory and/or motor deficits. Musculoskeletal: Patient is ambulating well without any assistive device.  No focal motor deficit of the bilateral lumbar spine extremities manually.  Mild mechanical lumbar pain, myositis reproducible on palpation as expected.  Additional findings included an unremarkable neurological exam, peripheral vascular exam normal and maneuvers demonstrated a negative July's sign. [de-identified] : X-ray 2 views of lumbar spine obtained and reviewed in the office today 8/824 demonstrating patient status post multilevel lumbar laminectomy without any acute spondylolisthesis or osseous abnormalities compared to previous postoperative imaging. [de-identified] : 17-year-old male 9 weeks status post lumbar laminectomy doing very well back to all activities without any limitations [de-identified] : At this time the patient can continue with activities as tolerated.  Continue with chiropractic treatment as needed.  Follow-up in 1 month for 3-month follow-up or as needed. All questions and concerns were addressed with the patient and they are in agreement with this plan.

## 2024-08-17 ENCOUNTER — NON-APPOINTMENT (OUTPATIENT)
Age: 69
End: 2024-08-17

## 2024-09-03 ENCOUNTER — APPOINTMENT (OUTPATIENT)
Dept: ORTHOPEDIC SURGERY | Facility: CLINIC | Age: 69
End: 2024-09-03

## 2024-09-11 ENCOUNTER — APPOINTMENT (OUTPATIENT)
Dept: ORTHOPEDIC SURGERY | Facility: CLINIC | Age: 69
End: 2024-09-11
Payer: COMMERCIAL

## 2024-09-11 VITALS
HEIGHT: 72 IN | BODY MASS INDEX: 31.69 KG/M2 | SYSTOLIC BLOOD PRESSURE: 150 MMHG | HEART RATE: 69 BPM | WEIGHT: 234 LBS | DIASTOLIC BLOOD PRESSURE: 96 MMHG

## 2024-09-11 DIAGNOSIS — M17.0 BILATERAL PRIMARY OSTEOARTHRITIS OF KNEE: ICD-10-CM

## 2024-09-11 PROCEDURE — 99213 OFFICE O/P EST LOW 20 MIN: CPT

## 2024-09-12 NOTE — PHYSICAL EXAM
[de-identified] : Left knee exam shows moderate effusion, ROM is 0-1 20 degrees, no instability, no pain with John, medial joint line tenderness. Right knee exam shows moderate effusion, ROM is 0-1 20 degrees, no instability, no pain with John, medial joint line tenderness. 5/5 motor strength in bilateral lower extremities. Sensory: Intact in bilateral lower extremities. DTRs: Biceps, brachioradialis, triceps, patellar, ankle and plantar 2+ and symmetric bilaterally. Pulses: dorsalis pedis, posterior tibial, femoral, popliteal, and radial 2+ and symmetric bilaterally.

## 2024-09-12 NOTE — PHYSICAL EXAM
[de-identified] : Left knee exam shows moderate effusion, ROM is 0-1 20 degrees, no instability, no pain with John, medial joint line tenderness. Right knee exam shows moderate effusion, ROM is 0-1 20 degrees, no instability, no pain with John, medial joint line tenderness. 5/5 motor strength in bilateral lower extremities. Sensory: Intact in bilateral lower extremities. DTRs: Biceps, brachioradialis, triceps, patellar, ankle and plantar 2+ and symmetric bilaterally. Pulses: dorsalis pedis, posterior tibial, femoral, popliteal, and radial 2+ and symmetric bilaterally.

## 2024-09-12 NOTE — REVIEW OF SYSTEMS
[Joint Pain] : joint pain [Joint Stiffness] : joint stiffness [Joint Swelling] : joint swelling [Negative] : Heme/Lymph [FreeTextEntry9] : b/l knee pain

## 2024-09-12 NOTE — HISTORY OF PRESENT ILLNESS
Abscessed Tooth in Children: Care Instructions  Your Care Instructions    An abscessed tooth is a tooth that has a pocket of pus in the tissues around it. Pus forms when the body tries to fight an infection caused by bacteria. If the pus cannot drain, it forms an abscess. An abscessed tooth can cause red, swollen gums and throbbing pain, especially when your child chews. Your child may have a bad taste in his or her mouth and a fever, and your child's jaw may swell. Damage to the tooth, untreated tooth decay, or gum disease can cause an abscessed tooth. An abscessed tooth needs to be treated by a dental professional right away. If it is not treated, the infection could spread to other parts of your child's body. A dentist will give your child antibiotics to stop the infection. He or she may make a hole in the tooth or cut open (anna) the abscess inside your child's mouth so that the infection can drain, which should relieve your child's pain. Your child may need to have a root canal treatment, which tries to save the tooth by taking out the infected pulp and replacing it with a healing medicine and/or a filling. If these treatments do not work, the dentist may have to remove the tooth. Follow-up care is a key part of your child's treatment and safety. Be sure to make and go to all appointments, and call your doctor if your child is having problems. It's also a good idea to know your child's test results and keep a list of the medicines your child takes. How can you care for your child at home? · Reduce pain and swelling in your child's face and jaw by putting ice or a cold pack on the outside of your child's cheek for 10 to 20 minutes at a time. Put a thin cloth between the ice and your child's skin. · Be safe with medicines. Give pain medicines exactly as directed. ¨ If the doctor gave your child a prescription medicine for pain, give it as prescribed.   ¨ If your child is not taking a prescription pain medicine, ask your doctor if your child can take an over-the-counter medicine. · Give your child antibiotics as directed. Do not stop using them just because your child feels better. Your child needs to take the full course of antibiotics. To prevent tooth abscess  · Have your child brush and floss every day and get regular dental checkups. · Give your child a healthy diet, and avoid sugary foods and drinks. When should you call for help? Call 911 anytime you think your child may need emergency care. For example, call if:  ? · Your child has trouble breathing. ?Call your doctor now or seek immediate medical care if:  ? · Your child has new or worse symptoms of infection, such as:  ¨ Increased pain, swelling, warmth, or redness. ¨ Red streaks leading from the area. ¨ Pus draining from the area. ¨ A fever. ? Watch closely for changes in your child's health, and be sure to contact your doctor if:  ? · Your child does not get better as expected. Where can you learn more? Go to http://christiano-jazmyn.info/. Enter F883 in the search box to learn more about \"Abscessed Tooth in Children: Care Instructions. \"  Current as of: May 12, 2017  Content Version: 11.4  © 4596-8029 [employer]. Care instructions adapted under license by [employer] (which disclaims liability or warranty for this information). If you have questions about a medical condition or this instruction, always ask your healthcare professional. [place] [address] any warranty or liability for your use of this information. Upper Respiratory Infection (Cold) in Children: Care Instructions  Your Care Instructions    An upper respiratory infection, also called a URI, is an infection of the nose, sinuses, or throat. URIs are spread by coughs, sneezes, and direct contact. The common cold is the most frequent kind of URI. The flu and sinus infections are other kinds of URIs.   Almost all URIs are caused by viruses, so antibiotics won't cure them. But you can do things at home to help your child get better. With most URIs, your child should feel better in 4 to 10 days. The doctor has checked your child carefully, but problems can develop later. If you notice any problems or new symptoms, get medical treatment right away. Follow-up care is a key part of your child's treatment and safety. Be sure to make and go to all appointments, and call your doctor if your child is having problems. It's also a good idea to know your child's test results and keep a list of the medicines your child takes. How can you care for your child at home? · Give your child acetaminophen (Tylenol) or ibuprofen (Advil, Motrin) for fever, pain, or fussiness. Read and follow all instructions on the label. Do not give aspirin to anyone younger than 20. It has been linked to Reye syndrome, a serious illness. Do not give ibuprofen to a child who is younger than 6 months. · Be careful with cough and cold medicines. Don't give them to children younger than 6, because they don't work for children that age and can even be harmful. For children 6 and older, always follow all the instructions carefully. Make sure you know how much medicine to give and how long to use it. And use the dosing device if one is included. · Be careful when giving your child over-the-counter cold or flu medicines and Tylenol at the same time. Many of these medicines have acetaminophen, which is Tylenol. Read the labels to make sure that you are not giving your child more than the recommended dose. Too much acetaminophen (Tylenol) can be harmful. · Make sure your child rests. Keep your child at home if he or she has a fever. · If your child has problems breathing because of a stuffy nose, squirt a few saline (saltwater) nasal drops in one nostril. Then have your child blow his or her nose. Repeat for the other nostril.  Do not do this more than 5 or 6 times [de-identified] : 68-year-old male presents to the office for follow-up of severe bilateral knee osteoarthritis, reports right worse than left.  He received a cortisone injection at his last visit which provided about 6 weeks of pain relief but he states that his pain has gradually began to return. Pain is made worse with walking, rising from a seated position, navigating stairs. Has been avoiding anti-inflammatories due to use of Keytruda for bladder cancer but has recently stopped so he is able to start taking them again. Patient was recently prescribed a medrol dose-caesar and will be starting that. Ambulates without use of assistive device. Denies any recent injuries/falls/trauma, neurovascular compromise.  a day.  · Place a humidifier by your child's bed or close to your child. This may make it easier for your child to breathe. Follow the directions for cleaning the machine. · Keep your child away from smoke. Do not smoke or let anyone else smoke around your child or in your house. · Wash your hands and your child's hands regularly so that you don't spread the disease. When should you call for help? Call 911 anytime you think your child may need emergency care. For example, call if:  ? · Your child seems very sick or is hard to wake up. ? · Your child has severe trouble breathing. Symptoms may include:  ¨ Using the belly muscles to breathe. ¨ The chest sinking in or the nostrils flaring when your child struggles to breathe. ?Call your doctor now or seek immediate medical care if:  ? · Your child has new or worse trouble breathing. ? · Your child has a new or higher fever. ? · Your child seems to be getting much sicker. ? · Your child coughs up dark brown or bloody mucus (sputum). ? Watch closely for changes in your child's health, and be sure to contact your doctor if:  ? · Your child has new symptoms, such as a rash, earache, or sore throat. ? · Your child does not get better as expected. Where can you learn more? Go to http://christiano-jazmyn.info/. Enter M207 in the search box to learn more about \"Upper Respiratory Infection (Cold) in Children: Care Instructions. \"  Current as of: May 12, 2017  Content Version: 11.4  © 1073-9144 [employer]. Care instructions adapted under license by [employer] (which disclaims liability or warranty for this information). If you have questions about a medical condition or this instruction, always ask your healthcare professional. [address] any warranty or liability for your use of this information.

## 2024-09-12 NOTE — HISTORY OF PRESENT ILLNESS
[de-identified] : 68-year-old male presents to the office for follow-up of severe bilateral knee osteoarthritis, reports right worse than left.  He received a cortisone injection at his last visit which provided about 6 weeks of pain relief but he states that his pain has gradually began to return. Pain is made worse with walking, rising from a seated position, navigating stairs. Has been avoiding anti-inflammatories due to use of Keytruda for bladder cancer but has recently stopped so he is able to start taking them again. Patient was recently prescribed a medrol dose-caesar and will be starting that. Ambulates without use of assistive device. Denies any recent injuries/falls/trauma, neurovascular compromise.

## 2024-09-12 NOTE — DISCUSSION/SUMMARY
[Medication Risks Reviewed] : Medication risks reviewed [Surgical risks reviewed] : Surgical risks reviewed [de-identified] : 68-year-old male presents to the office for follow-up of his severe bilateral knee osteoarthritis, patient reports right worse than left.  We had a discussion regarding surgical versus conservative treatment options.  The patient would like to defer surgery and exhaust all conservative therapy.  We discussed the possibility of viscosupplementation, the patient would like to proceed.  I have ordered gel injections for bilateral knees.  I recommend he start physical therapy and provided referral for that today.  The patient may take Tylenol and NSAIDs as needed for any pain.  He should follow-up after the approval of his viscosupplementation.  All questions addressed, patient agreement

## 2024-09-16 RX ORDER — HYALURONATE SODIUM 20 MG/2 ML
20 SYRINGE (ML) INTRAARTICULAR
Qty: 2 | Refills: 0 | Status: ACTIVE | OUTPATIENT
Start: 2024-09-11

## 2024-09-25 ENCOUNTER — APPOINTMENT (OUTPATIENT)
Dept: ORTHOPEDIC SURGERY | Facility: CLINIC | Age: 69
End: 2024-09-25
Payer: COMMERCIAL

## 2024-09-25 VITALS
HEART RATE: 62 BPM | DIASTOLIC BLOOD PRESSURE: 83 MMHG | WEIGHT: 236 LBS | SYSTOLIC BLOOD PRESSURE: 146 MMHG | HEIGHT: 72 IN | BODY MASS INDEX: 31.97 KG/M2

## 2024-09-25 DIAGNOSIS — M17.0 BILATERAL PRIMARY OSTEOARTHRITIS OF KNEE: ICD-10-CM

## 2024-09-25 PROCEDURE — 99213 OFFICE O/P EST LOW 20 MIN: CPT | Mod: 25

## 2024-09-25 PROCEDURE — 20610 DRAIN/INJ JOINT/BURSA W/O US: CPT | Mod: 50

## 2024-09-26 NOTE — PROCEDURE
[de-identified] : Euflexxa # 1 right knee Discussed at length with the patient the planned Euflexxa injection. The risks, benefits, convalescence and alternatives were reviewed. The possible side effects discussed included but were not limited to: pain, swelling, heat and redness. There symptoms are generally mild but if they are extensive then contact the office. Giving pain relievers by mouth such as NSAIDs or Tylenol can generally treat the reactions to Euflexxa. Rare cases of infection have been noted. Rash, hives and itching may occur post injection. If you have muscle pain or cramps, flushing and or swelling of the face, rapid heart beat, nausea, dizziness, fever, chills, headache, difficulty breathing, swelling in the arms or legs, or have a prickly feeling of your skin, contact a health care provider immediately.  Following this discussion, the knee was prepped with betadine and under sterile condition the Euflexxa injection was performed with a 22 gauge needle. The needle was introduced into the joint, aspiration was performed to ensure intra-articular placement and the medication was injected. Upon withdrawal of the needle the site was cleaned with alcohol and a bandaid applied. The patient tolerated the injection well and there were no adverse effects. Post injection instructions included no strenuous activity for 24 hours, cryotherapy and if there are any adverse effects to contact the office.  Euflexxa # 1 left knee Discussed at length with the patient the planned Euflexxa injection. The risks, benefits, convalescence and alternatives were reviewed. The possible side effects discussed included but were not limited to: pain, swelling, heat and redness. There symptoms are generally mild but if they are extensive then contact the office. Giving pain relievers by mouth such as NSAIDs or Tylenol can generally treat the reactions to Euflexxa. Rare cases of infection have been noted. Rash, hives and itching may occur post injection. If you have muscle pain or cramps, flushing and or swelling of the face, rapid heart beat, nausea, dizziness, fever, chills, headache, difficulty breathing, swelling in the arms or legs, or have a prickly feeling of your skin, contact a health care provider immediately.  Following this discussion, the knee was prepped with betadine and under sterile condition the Euflexxa injection was performed with a 22 gauge needle. The needle was introduced into the joint, aspiration was performed to ensure intra-articular placement and the medication was injected. Upon withdrawal of the needle the site was cleaned with alcohol and a bandaid applied. The patient tolerated the injection well and there were no adverse effects. Post injection instructions included no strenuous activity for 24 hours, cryotherapy and if there are any adverse effects to contact the office.

## 2024-09-26 NOTE — PHYSICAL EXAM
[de-identified] : Left knee exam shows moderate effusion, ROM is 0-1 20 degrees, no instability, no pain with John, medial joint line tenderness. Right knee exam shows moderate effusion, ROM is 0-1 20 degrees, no instability, no pain with John, medial joint line tenderness. 5/5 motor strength in bilateral lower extremities. Sensory: Intact in bilateral lower extremities. DTRs: Biceps, brachioradialis, triceps, patellar, ankle and plantar 2+ and symmetric bilaterally. Pulses: dorsalis pedis, posterior tibial, femoral, popliteal, and radial 2+ and symmetric bilaterally.

## 2024-09-26 NOTE — REASON FOR VISIT
[Follow-Up Visit] : a follow-up visit for [FreeTextEntry2] :  bilateral Euflexxa #1 lot [id number] exp 10/26/2025

## 2024-09-26 NOTE — DISCUSSION/SUMMARY
[Medication Risks Reviewed] : Medication risks reviewed [Surgical risks reviewed] : Surgical risks reviewed [de-identified] : 68-year-old male presents to the office status post Euflexxa injection 1 out of 3 to bilateral knees.  Tolerated well.  Will follow-up in 1 week for repeat injection.  All questions addressed, patient in agreement.

## 2024-09-26 NOTE — PHYSICAL EXAM
[de-identified] : Left knee exam shows moderate effusion, ROM is 0-1 20 degrees, no instability, no pain with John, medial joint line tenderness. Right knee exam shows moderate effusion, ROM is 0-1 20 degrees, no instability, no pain with John, medial joint line tenderness. 5/5 motor strength in bilateral lower extremities. Sensory: Intact in bilateral lower extremities. DTRs: Biceps, brachioradialis, triceps, patellar, ankle and plantar 2+ and symmetric bilaterally. Pulses: dorsalis pedis, posterior tibial, femoral, popliteal, and radial 2+ and symmetric bilaterally.

## 2024-09-26 NOTE — PROCEDURE
[de-identified] : Euflexxa # 1 right knee Discussed at length with the patient the planned Euflexxa injection. The risks, benefits, convalescence and alternatives were reviewed. The possible side effects discussed included but were not limited to: pain, swelling, heat and redness. There symptoms are generally mild but if they are extensive then contact the office. Giving pain relievers by mouth such as NSAIDs or Tylenol can generally treat the reactions to Euflexxa. Rare cases of infection have been noted. Rash, hives and itching may occur post injection. If you have muscle pain or cramps, flushing and or swelling of the face, rapid heart beat, nausea, dizziness, fever, chills, headache, difficulty breathing, swelling in the arms or legs, or have a prickly feeling of your skin, contact a health care provider immediately.  Following this discussion, the knee was prepped with betadine and under sterile condition the Euflexxa injection was performed with a 22 gauge needle. The needle was introduced into the joint, aspiration was performed to ensure intra-articular placement and the medication was injected. Upon withdrawal of the needle the site was cleaned with alcohol and a bandaid applied. The patient tolerated the injection well and there were no adverse effects. Post injection instructions included no strenuous activity for 24 hours, cryotherapy and if there are any adverse effects to contact the office.  Euflexxa # 1 left knee Discussed at length with the patient the planned Euflexxa injection. The risks, benefits, convalescence and alternatives were reviewed. The possible side effects discussed included but were not limited to: pain, swelling, heat and redness. There symptoms are generally mild but if they are extensive then contact the office. Giving pain relievers by mouth such as NSAIDs or Tylenol can generally treat the reactions to Euflexxa. Rare cases of infection have been noted. Rash, hives and itching may occur post injection. If you have muscle pain or cramps, flushing and or swelling of the face, rapid heart beat, nausea, dizziness, fever, chills, headache, difficulty breathing, swelling in the arms or legs, or have a prickly feeling of your skin, contact a health care provider immediately.  Following this discussion, the knee was prepped with betadine and under sterile condition the Euflexxa injection was performed with a 22 gauge needle. The needle was introduced into the joint, aspiration was performed to ensure intra-articular placement and the medication was injected. Upon withdrawal of the needle the site was cleaned with alcohol and a bandaid applied. The patient tolerated the injection well and there were no adverse effects. Post injection instructions included no strenuous activity for 24 hours, cryotherapy and if there are any adverse effects to contact the office.

## 2024-09-26 NOTE — DISCUSSION/SUMMARY
[Medication Risks Reviewed] : Medication risks reviewed [Surgical risks reviewed] : Surgical risks reviewed [de-identified] : 68-year-old male presents to the office status post Euflexxa injection 1 out of 3 to bilateral knees.  Tolerated well.  Will follow-up in 1 week for repeat injection.  All questions addressed, patient in agreement.

## 2024-10-01 ENCOUNTER — NON-APPOINTMENT (OUTPATIENT)
Age: 69
End: 2024-10-01

## 2024-10-09 ENCOUNTER — APPOINTMENT (OUTPATIENT)
Dept: ORTHOPEDIC SURGERY | Facility: CLINIC | Age: 69
End: 2024-10-09
Payer: COMMERCIAL

## 2024-10-09 PROCEDURE — 20610 DRAIN/INJ JOINT/BURSA W/O US: CPT | Mod: 50

## 2024-10-16 ENCOUNTER — APPOINTMENT (OUTPATIENT)
Dept: ORTHOPEDIC SURGERY | Facility: CLINIC | Age: 69
End: 2024-10-16
Payer: COMMERCIAL

## 2024-10-16 DIAGNOSIS — M17.0 BILATERAL PRIMARY OSTEOARTHRITIS OF KNEE: ICD-10-CM

## 2024-10-16 PROCEDURE — 20610 DRAIN/INJ JOINT/BURSA W/O US: CPT | Mod: 50

## 2024-11-06 ENCOUNTER — APPOINTMENT (OUTPATIENT)
Dept: RHEUMATOLOGY | Facility: CLINIC | Age: 69
End: 2024-11-06

## 2024-11-06 VITALS
HEIGHT: 72 IN | BODY MASS INDEX: 31.15 KG/M2 | SYSTOLIC BLOOD PRESSURE: 128 MMHG | TEMPERATURE: 97.9 F | WEIGHT: 230 LBS | DIASTOLIC BLOOD PRESSURE: 82 MMHG | HEART RATE: 88 BPM | OXYGEN SATURATION: 98 %

## 2024-11-06 PROCEDURE — 99204 OFFICE O/P NEW MOD 45 MIN: CPT

## 2024-11-06 RX ORDER — SERTRALINE HYDROCHLORIDE 25 MG/1
TABLET, FILM COATED ORAL
Refills: 0 | Status: ACTIVE | COMMUNITY

## 2024-11-09 LAB
ALBUMIN SERPL ELPH-MCNC: 4.3 G/DL
ALP BLD-CCNC: 73 U/L
ALT SERPL-CCNC: 21 U/L
ANION GAP SERPL CALC-SCNC: 13 MMOL/L
APPEARANCE: CLEAR
AST SERPL-CCNC: 28 U/L
BILIRUB SERPL-MCNC: 0.8 MG/DL
BILIRUBIN URINE: NEGATIVE
BLOOD URINE: NEGATIVE
BUN SERPL-MCNC: 11 MG/DL
C3 SERPL-MCNC: 164 MG/DL
C4 SERPL-MCNC: 34 MG/DL
CALCIUM SERPL-MCNC: 9.7 MG/DL
CHLORIDE SERPL-SCNC: 96 MMOL/L
CK SERPL-CCNC: 260 U/L
CO2 SERPL-SCNC: 26 MMOL/L
COLOR: YELLOW
CREAT SERPL-MCNC: 1.08 MG/DL
CREAT SPEC-SCNC: 60 MG/DL
CREAT/PROT UR: 0.2 RATIO
CRP SERPL-MCNC: 27 MG/L
DSDNA AB SER-ACNC: <1 IU/ML
EGFR: 75 ML/MIN/1.73M2
ERYTHROCYTE [SEDIMENTATION RATE] IN BLOOD BY WESTERGREN METHOD: 12 MM/HR
FERRITIN SERPL-MCNC: 68 NG/ML
GLUCOSE QUALITATIVE U: NEGATIVE MG/DL
GLUCOSE SERPL-MCNC: 92 MG/DL
HCT VFR BLD CALC: 53.7 %
HGB BLD-MCNC: 17.6 G/DL
IRON SATN MFR SERPL: 21 %
IRON SERPL-MCNC: 71 UG/DL
KETONES URINE: NEGATIVE MG/DL
LEUKOCYTE ESTERASE URINE: NEGATIVE
MCHC RBC-ENTMCNC: 29.3 PG
MCHC RBC-ENTMCNC: 32.8 G/DL
MCV RBC AUTO: 89.5 FL
NITRITE URINE: NEGATIVE
PH URINE: 6
PLATELET # BLD AUTO: 296 K/UL
POTASSIUM SERPL-SCNC: 4.2 MMOL/L
PROT SERPL-MCNC: 7.1 G/DL
PROT UR-MCNC: 9 MG/DL
PROTEIN URINE: NEGATIVE MG/DL
RBC # BLD: 6 M/UL
RBC # FLD: 12.5 %
RHEUMATOID FACT SER QL: <10 IU/ML
SODIUM SERPL-SCNC: 135 MMOL/L
SPECIFIC GRAVITY URINE: 1.01
TIBC SERPL-MCNC: 331 UG/DL
UIBC SERPL-MCNC: 261 UG/DL
UROBILINOGEN URINE: 0.2 MG/DL
WBC # FLD AUTO: 7.35 K/UL

## 2024-11-11 LAB — ANA SER IF-ACNC: NEGATIVE

## 2024-11-25 PROBLEM — R21 RASH: Status: ACTIVE | Noted: 2024-11-25

## 2024-11-25 PROBLEM — E66.811 OBESITY (BMI 30.0-34.9): Status: ACTIVE | Noted: 2017-01-01

## 2024-11-25 PROBLEM — R53.82 CHRONIC FATIGUE: Status: ACTIVE | Noted: 2024-11-25

## 2024-12-12 ENCOUNTER — APPOINTMENT (OUTPATIENT)
Dept: RHEUMATOLOGY | Facility: CLINIC | Age: 69
End: 2024-12-12

## 2025-04-04 ENCOUNTER — NON-APPOINTMENT (OUTPATIENT)
Age: 70
End: 2025-04-04

## 2025-04-08 ENCOUNTER — APPOINTMENT (OUTPATIENT)
Dept: RHEUMATOLOGY | Facility: CLINIC | Age: 70
End: 2025-04-08